# Patient Record
Sex: MALE | Race: BLACK OR AFRICAN AMERICAN | NOT HISPANIC OR LATINO | Employment: OTHER | URBAN - METROPOLITAN AREA
[De-identification: names, ages, dates, MRNs, and addresses within clinical notes are randomized per-mention and may not be internally consistent; named-entity substitution may affect disease eponyms.]

---

## 2020-04-20 ENCOUNTER — HOSPITAL ENCOUNTER (OUTPATIENT)
Facility: HOSPITAL | Age: 75
Discharge: HOME | End: 2020-04-20
Attending: EMERGENCY MEDICINE | Admitting: INTERNAL MEDICINE
Payer: MEDICARE

## 2020-04-20 VITALS
RESPIRATION RATE: 19 BRPM | WEIGHT: 109.25 LBS | TEMPERATURE: 98 F | HEART RATE: 77 BPM | OXYGEN SATURATION: 100 % | BODY MASS INDEX: 14.82 KG/M2 | DIASTOLIC BLOOD PRESSURE: 84 MMHG | SYSTOLIC BLOOD PRESSURE: 138 MMHG

## 2020-04-20 DIAGNOSIS — F10.20 ALCOHOL USE DISORDER, SEVERE, DEPENDENCE: ICD-10-CM

## 2020-04-20 DIAGNOSIS — R63.4 WEIGHT LOSS: ICD-10-CM

## 2020-04-20 DIAGNOSIS — J18.9 PNEUMONIA OF RIGHT UPPER LOBE DUE TO INFECTIOUS ORGANISM: Primary | ICD-10-CM

## 2020-04-20 DIAGNOSIS — E87.1 HYPONATREMIA: ICD-10-CM

## 2020-04-20 DIAGNOSIS — R21 RASH AND NONSPECIFIC SKIN ERUPTION: ICD-10-CM

## 2020-04-20 DIAGNOSIS — F17.200 TOBACCO USE DISORDER, MILD, ABUSE: ICD-10-CM

## 2020-04-20 DIAGNOSIS — D64.9 ANEMIA, UNSPECIFIED TYPE: ICD-10-CM

## 2020-04-20 DIAGNOSIS — R51.9 NONINTRACTABLE HEADACHE, UNSPECIFIED CHRONICITY PATTERN, UNSPECIFIED HEADACHE TYPE: ICD-10-CM

## 2020-04-20 DIAGNOSIS — R55 SYNCOPE AND COLLAPSE: ICD-10-CM

## 2020-04-20 DIAGNOSIS — Z91.148 NON COMPLIANCE W MEDICATION REGIMEN: ICD-10-CM

## 2020-04-20 LAB
ALBUMIN SERPL BCP-MCNC: 2.8 G/DL (ref 3.5–5.2)
ALLENS TEST: ABNORMAL
ALP SERPL-CCNC: 79 U/L (ref 55–135)
ALT SERPL W/O P-5'-P-CCNC: 14 U/L (ref 10–44)
ANION GAP SERPL CALC-SCNC: 10 MMOL/L (ref 8–16)
ANION GAP SERPL CALC-SCNC: 10 MMOL/L (ref 8–16)
AST SERPL-CCNC: 34 U/L (ref 10–40)
BASOPHILS # BLD AUTO: 0.01 K/UL (ref 0–0.2)
BASOPHILS NFR BLD: 0.2 % (ref 0–1.9)
BILIRUB SERPL-MCNC: 0.6 MG/DL (ref 0.1–1)
BILIRUB UR QL STRIP: NEGATIVE
BNP SERPL-MCNC: 69 PG/ML (ref 0–99)
BUN SERPL-MCNC: 6 MG/DL (ref 8–23)
BUN SERPL-MCNC: 7 MG/DL (ref 8–23)
CALCIUM SERPL-MCNC: 7.6 MG/DL (ref 8.7–10.5)
CALCIUM SERPL-MCNC: 8.3 MG/DL (ref 8.7–10.5)
CHLORIDE SERPL-SCNC: 92 MMOL/L (ref 95–110)
CHLORIDE SERPL-SCNC: 96 MMOL/L (ref 95–110)
CLARITY UR REFRACT.AUTO: CLEAR
CO2 SERPL-SCNC: 20 MMOL/L (ref 23–29)
CO2 SERPL-SCNC: 21 MMOL/L (ref 23–29)
COLOR UR AUTO: YELLOW
CREAT SERPL-MCNC: 0.7 MG/DL (ref 0.5–1.4)
CREAT SERPL-MCNC: 0.7 MG/DL (ref 0.5–1.4)
DELSYS: ABNORMAL
DIFFERENTIAL METHOD: ABNORMAL
EOSINOPHIL # BLD AUTO: 0 K/UL (ref 0–0.5)
EOSINOPHIL NFR BLD: 0.4 % (ref 0–8)
ERYTHROCYTE [DISTWIDTH] IN BLOOD BY AUTOMATED COUNT: 16.5 % (ref 11.5–14.5)
EST. GFR  (AFRICAN AMERICAN): >60 ML/MIN/1.73 M^2
EST. GFR  (AFRICAN AMERICAN): >60 ML/MIN/1.73 M^2
EST. GFR  (NON AFRICAN AMERICAN): >60 ML/MIN/1.73 M^2
EST. GFR  (NON AFRICAN AMERICAN): >60 ML/MIN/1.73 M^2
ETHANOL SERPL-MCNC: 206 MG/DL
FIO2: 21
GLUCOSE SERPL-MCNC: 107 MG/DL (ref 70–110)
GLUCOSE SERPL-MCNC: 87 MG/DL (ref 70–110)
GLUCOSE SERPL-MCNC: 88 MG/DL (ref 70–110)
GLUCOSE UR QL STRIP: NEGATIVE
HCO3 UR-SCNC: 20.8 MMOL/L (ref 24–28)
HCT VFR BLD AUTO: 30.8 % (ref 40–54)
HCT VFR BLD CALC: 36 %PCV (ref 36–54)
HGB BLD-MCNC: 10.8 G/DL (ref 14–18)
HGB UR QL STRIP: NEGATIVE
IMM GRANULOCYTES # BLD AUTO: 0.01 K/UL (ref 0–0.04)
IMM GRANULOCYTES NFR BLD AUTO: 0.2 % (ref 0–0.5)
KETONES UR QL STRIP: NEGATIVE
LACTATE SERPL-SCNC: 3.2 MMOL/L (ref 0.5–2.2)
LACTATE SERPL-SCNC: 3.3 MMOL/L (ref 0.5–2.2)
LEUKOCYTE ESTERASE UR QL STRIP: NEGATIVE
LIPASE SERPL-CCNC: 9 U/L (ref 4–60)
LYMPHOCYTES # BLD AUTO: 1.6 K/UL (ref 1–4.8)
LYMPHOCYTES NFR BLD: 28.3 % (ref 18–48)
MAGNESIUM SERPL-MCNC: 1.7 MG/DL (ref 1.6–2.6)
MCH RBC QN AUTO: 35.2 PG (ref 27–31)
MCHC RBC AUTO-ENTMCNC: 35.1 G/DL (ref 32–36)
MCV RBC AUTO: 100 FL (ref 82–98)
MODE: ABNORMAL
MONOCYTES # BLD AUTO: 0.8 K/UL (ref 0.3–1)
MONOCYTES NFR BLD: 14.1 % (ref 4–15)
NEUTROPHILS # BLD AUTO: 3.2 K/UL (ref 1.8–7.7)
NEUTROPHILS NFR BLD: 56.8 % (ref 38–73)
NITRITE UR QL STRIP: NEGATIVE
NRBC BLD-RTO: 0 /100 WBC
PCO2 BLDA: 34.2 MMHG (ref 35–45)
PH SMN: 7.39 [PH] (ref 7.35–7.45)
PH UR STRIP: 6 [PH] (ref 5–8)
PHOSPHATE SERPL-MCNC: 2.4 MG/DL (ref 2.7–4.5)
PLATELET # BLD AUTO: 101 K/UL (ref 150–350)
PMV BLD AUTO: 11.1 FL (ref 9.2–12.9)
PO2 BLDA: 35 MMHG (ref 40–60)
POC BE: -4 MMOL/L
POC IONIZED CALCIUM: 1.15 MMOL/L (ref 1.06–1.42)
POC SATURATED O2: 67 % (ref 95–100)
POCT GLUCOSE: 92 MG/DL (ref 70–110)
POTASSIUM BLD-SCNC: 3.9 MMOL/L (ref 3.5–5.1)
POTASSIUM SERPL-SCNC: 3.3 MMOL/L (ref 3.5–5.1)
POTASSIUM SERPL-SCNC: 4 MMOL/L (ref 3.5–5.1)
PROT SERPL-MCNC: 8.1 G/DL (ref 6–8.4)
PROT UR QL STRIP: NEGATIVE
RBC # BLD AUTO: 3.07 M/UL (ref 4.6–6.2)
SAMPLE: ABNORMAL
SARS-COV-2 RDRP RESP QL NAA+PROBE: NEGATIVE
SITE: ABNORMAL
SODIUM BLD-SCNC: 124 MMOL/L (ref 136–145)
SODIUM SERPL-SCNC: 123 MMOL/L (ref 136–145)
SODIUM SERPL-SCNC: 126 MMOL/L (ref 136–145)
SP GR UR STRIP: <=1.005 (ref 1–1.03)
TROPONIN I SERPL DL<=0.01 NG/ML-MCNC: 0.02 NG/ML (ref 0–0.03)
TSH SERPL DL<=0.005 MIU/L-ACNC: 3.08 UIU/ML (ref 0.4–4)
URN SPEC COLLECT METH UR: ABNORMAL
UROBILINOGEN UR STRIP-ACNC: NEGATIVE EU/DL
WBC # BLD AUTO: 5.66 K/UL (ref 3.9–12.7)

## 2020-04-20 PROCEDURE — G0378 HOSPITAL OBSERVATION PER HR: HCPCS | Mod: ER

## 2020-04-20 PROCEDURE — 80320 DRUG SCREEN QUANTALCOHOLS: CPT | Mod: ER

## 2020-04-20 PROCEDURE — 96372 THER/PROPH/DIAG INJ SC/IM: CPT | Mod: 59,ER

## 2020-04-20 PROCEDURE — 84100 ASSAY OF PHOSPHORUS: CPT | Mod: ER

## 2020-04-20 PROCEDURE — 83605 ASSAY OF LACTIC ACID: CPT | Mod: 91,ER

## 2020-04-20 PROCEDURE — 93010 EKG 12-LEAD: ICD-10-PCS | Mod: ,,, | Performed by: INTERNAL MEDICINE

## 2020-04-20 PROCEDURE — 85025 COMPLETE CBC W/AUTO DIFF WBC: CPT | Mod: ER

## 2020-04-20 PROCEDURE — 82962 GLUCOSE BLOOD TEST: CPT | Mod: ER

## 2020-04-20 PROCEDURE — 83735 ASSAY OF MAGNESIUM: CPT | Mod: ER

## 2020-04-20 PROCEDURE — 63600175 PHARM REV CODE 636 W HCPCS: Mod: ER | Performed by: EMERGENCY MEDICINE

## 2020-04-20 PROCEDURE — U0002 COVID-19 LAB TEST NON-CDC: HCPCS | Mod: ER

## 2020-04-20 PROCEDURE — 83880 ASSAY OF NATRIURETIC PEPTIDE: CPT | Mod: ER

## 2020-04-20 PROCEDURE — 83605 ASSAY OF LACTIC ACID: CPT | Mod: ER

## 2020-04-20 PROCEDURE — 99291 CRITICAL CARE FIRST HOUR: CPT | Mod: ER

## 2020-04-20 PROCEDURE — 96365 THER/PROPH/DIAG IV INF INIT: CPT | Mod: ER

## 2020-04-20 PROCEDURE — 80048 BASIC METABOLIC PNL TOTAL CA: CPT | Mod: ER

## 2020-04-20 PROCEDURE — 81003 URINALYSIS AUTO W/O SCOPE: CPT | Mod: ER

## 2020-04-20 PROCEDURE — 93005 ELECTROCARDIOGRAM TRACING: CPT | Mod: ER

## 2020-04-20 PROCEDURE — 83690 ASSAY OF LIPASE: CPT | Mod: ER

## 2020-04-20 PROCEDURE — 84443 ASSAY THYROID STIM HORMONE: CPT | Mod: ER

## 2020-04-20 PROCEDURE — 25000003 PHARM REV CODE 250: Mod: ER | Performed by: EMERGENCY MEDICINE

## 2020-04-20 PROCEDURE — 93010 ELECTROCARDIOGRAM REPORT: CPT | Mod: ,,, | Performed by: INTERNAL MEDICINE

## 2020-04-20 PROCEDURE — 96361 HYDRATE IV INFUSION ADD-ON: CPT | Mod: ER

## 2020-04-20 PROCEDURE — 96375 TX/PRO/DX INJ NEW DRUG ADDON: CPT | Mod: ER

## 2020-04-20 PROCEDURE — 99900035 HC TECH TIME PER 15 MIN (STAT): Mod: ER

## 2020-04-20 PROCEDURE — 80053 COMPREHEN METABOLIC PANEL: CPT | Mod: ER

## 2020-04-20 PROCEDURE — 84484 ASSAY OF TROPONIN QUANT: CPT | Mod: ER

## 2020-04-20 PROCEDURE — 87040 BLOOD CULTURE FOR BACTERIA: CPT | Mod: 59

## 2020-04-20 RX ORDER — ALBUTEROL SULFATE 90 UG/1
2 AEROSOL, METERED RESPIRATORY (INHALATION) EVERY 6 HOURS PRN
Qty: 18 G | Refills: 11 | Status: SHIPPED | OUTPATIENT
Start: 2020-04-20 | End: 2021-04-20

## 2020-04-20 RX ORDER — SODIUM CHLORIDE 9 MG/ML
1000 INJECTION, SOLUTION INTRAVENOUS
Status: COMPLETED | OUTPATIENT
Start: 2020-04-20 | End: 2020-04-20

## 2020-04-20 RX ORDER — AZITHROMYCIN 250 MG/1
TABLET, FILM COATED ORAL
Qty: 6 TABLET | Refills: 0 | Status: ON HOLD | OUTPATIENT
Start: 2020-04-20 | End: 2020-09-16

## 2020-04-20 RX ORDER — ACETAMINOPHEN 325 MG/1
650 TABLET ORAL
Status: COMPLETED | OUTPATIENT
Start: 2020-04-20 | End: 2020-04-20

## 2020-04-20 RX ORDER — THIAMINE HYDROCHLORIDE 100 MG/ML
100 INJECTION, SOLUTION INTRAMUSCULAR; INTRAVENOUS
Status: COMPLETED | OUTPATIENT
Start: 2020-04-20 | End: 2020-04-20

## 2020-04-20 RX ORDER — AMOXICILLIN AND CLAVULANATE POTASSIUM 875; 125 MG/1; MG/1
1 TABLET, FILM COATED ORAL 2 TIMES DAILY
Qty: 14 TABLET | Refills: 0 | Status: ON HOLD | OUTPATIENT
Start: 2020-04-20 | End: 2020-09-16

## 2020-04-20 RX ADMIN — CEFTRIAXONE 1 G: 1 INJECTION, SOLUTION INTRAVENOUS at 07:04

## 2020-04-20 RX ADMIN — SODIUM CHLORIDE 1000 ML: 0.9 INJECTION, SOLUTION INTRAVENOUS at 05:04

## 2020-04-20 RX ADMIN — ACETAMINOPHEN 650 MG: 325 TABLET ORAL at 06:04

## 2020-04-20 RX ADMIN — THIAMINE HYDROCHLORIDE 100 MG: 100 INJECTION, SOLUTION INTRAMUSCULAR; INTRAVENOUS at 05:04

## 2020-04-20 RX ADMIN — SODIUM CHLORIDE 500 ML: 0.9 INJECTION, SOLUTION INTRAVENOUS at 07:04

## 2020-04-20 RX ADMIN — AZITHROMYCIN MONOHYDRATE 500 MG: 500 INJECTION, POWDER, LYOPHILIZED, FOR SOLUTION INTRAVENOUS at 08:04

## 2020-04-20 NOTE — ED PROVIDER NOTES
"Encounter Date: 4/20/2020       History     Chief Complaint   Patient presents with    Dizziness    Fall    Cough    Altered Mental Status     unsure if this is the pt's baseline    Headache     75 y/o M with PMH of HTN, HLD, alcohol abuse, tobacco abuse here due to multiple syncopal episodes. Patient has had at least 2 over the past day per daughter, but the patient thinks he has had about 5. He reports trying to stand up, and then collapsing to the ground. Afterwards, he is able to get himself up without assistance, and does not feel confused. Patient says that over the past 5 days he has had associated frontal, pressure like headache, mild non productive cough, loose brown stools. Denies any fever, chest pain, SOB, back pain, neck pain, abdominal pain, black or tarry stools, dysuria. Has not taken any medications in "a while".        Review of patient's allergies indicates:  No Known Allergies  Past Medical History:   Diagnosis Date    Blind left eye      Past Surgical History:   Procedure Laterality Date    ABDOMINAL SURGERY       History reviewed. No pertinent family history.  Social History     Tobacco Use    Smoking status: Current Every Day Smoker     Packs/day: 2.00     Types: Cigarettes    Smokeless tobacco: Never Used   Substance Use Topics    Alcohol use: Yes     Alcohol/week: 8.0 standard drinks     Types: 8 Cans of beer per week     Comment: beer daily    Drug use: No     Review of Systems   Constitutional: Negative for chills and fever.   HENT: Negative for congestion and dental problem.    Eyes: Positive for visual disturbance. Negative for pain.        Blind in left eye at baseline.    Respiratory: Positive for cough. Negative for shortness of breath.    Cardiovascular: Negative for chest pain and palpitations.   Gastrointestinal: Positive for diarrhea. Negative for abdominal pain, nausea and vomiting.   Genitourinary: Negative for dysuria and flank pain.   Musculoskeletal: Negative for " back pain and neck pain.   Skin: Negative for rash and wound.   Neurological: Positive for syncope and headaches. Negative for weakness and numbness.       Physical Exam     Initial Vitals [04/20/20 1700]   BP Pulse Resp Temp SpO2   130/89 100 16 97.5 °F (36.4 °C) 100 %      MAP       --         Physical Exam    Nursing note and vitals reviewed.  Constitutional: No distress.   Frail, Cachectic.    HENT:   Head: Normocephalic and atraumatic.   Dry mucous membranes.    Eyes: EOM are normal.   Left eye with cloudy, white conjunctiva over the iris and pupil, at his baseline.    Neck: Normal range of motion. Neck supple.   Cardiovascular: Regular rhythm and intact distal pulses.   Tachycardia   Pulmonary/Chest: Breath sounds normal. No respiratory distress. He exhibits no tenderness.   Abdominal: Soft. He exhibits no distension and no mass. There is no tenderness. There is no rebound and no guarding.   Musculoskeletal: Normal range of motion. He exhibits no tenderness.   No midline spinal tenderness to palpation. No obvious bony defmormity. 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    Neurological: He has normal strength. No cranial nerve deficit or sensory deficit. GCS score is 15. GCS eye subscore is 4. GCS verbal subscore is 5. GCS motor subscore is 6.   Awake. Alert. Oriented to person, place, conversation, situation.    Skin: Skin is warm and dry.   Superficial hyperpigmented and scaling rash to upper back and arms.    Psychiatric: He has a normal mood and affect.         ED Course   Critical Care  Date/Time: 4/20/2020 7:24 PM  Performed by: Demetri Pedersen MD  Authorized by: Demetri Pedersen MD   Total critical care time (exclusive of procedural time) : 45 minutes  Critical care time was exclusive of separately billable procedures and treating other patients.  Critical care was necessary to treat or prevent imminent or life-threatening deterioration of the following conditions:  dehydration, respiratory failure, sepsis and metabolic crisis.        Labs Reviewed   CBC W/ AUTO DIFFERENTIAL - Abnormal; Notable for the following components:       Result Value    RBC 3.07 (*)     Hemoglobin 10.8 (*)     Hematocrit 30.8 (*)     Mean Corpuscular Volume 100 (*)     Mean Corpuscular Hemoglobin 35.2 (*)     RDW 16.5 (*)     Platelets 101 (*)     All other components within normal limits   COMPREHENSIVE METABOLIC PANEL - Abnormal; Notable for the following components:    Sodium 123 (*)     Chloride 92 (*)     CO2 21 (*)     BUN, Bld 7 (*)     Calcium 8.3 (*)     Albumin 2.8 (*)     All other components within normal limits   LACTIC ACID, PLASMA - Abnormal; Notable for the following components:    Lactate (Lactic Acid) 3.2 (*)     All other components within normal limits   PHOSPHORUS - Abnormal; Notable for the following components:    Phosphorus 2.4 (*)     All other components within normal limits   URINALYSIS, REFLEX TO URINE CULTURE - Abnormal; Notable for the following components:    Specific Gravity, UA <=1.005 (*)     All other components within normal limits    Narrative:     Preferred Collection Type->Urine, Clean Catch   ALCOHOL,MEDICAL (ETHANOL) - Abnormal; Notable for the following components:    Alcohol, Medical, Serum 206 (*)     All other components within normal limits   ISTAT PROCEDURE - Abnormal; Notable for the following components:    POC PCO2 34.2 (*)     POC PO2 35 (*)     POC HCO3 20.8 (*)     POC SATURATED O2 67 (*)     POC Sodium 124 (*)     All other components within normal limits   CULTURE, BLOOD   CULTURE, BLOOD   LIPASE   MAGNESIUM   SARS-COV-2 RNA AMPLIFICATION, QUAL   TROPONIN I   TSH   B-TYPE NATRIURETIC PEPTIDE   POCT GLUCOSE   POCT GLUCOSE MONITORING CONTINUOUS     Results for orders placed or performed during the hospital encounter of 04/20/20   CBC auto differential   Result Value Ref Range    WBC 5.66 3.90 - 12.70 K/uL    RBC 3.07 (L) 4.60 - 6.20 M/uL     Hemoglobin 10.8 (L) 14.0 - 18.0 g/dL    Hematocrit 30.8 (L) 40.0 - 54.0 %    Mean Corpuscular Volume 100 (H) 82 - 98 fL    Mean Corpuscular Hemoglobin 35.2 (H) 27.0 - 31.0 pg    Mean Corpuscular Hemoglobin Conc 35.1 32.0 - 36.0 g/dL    RDW 16.5 (H) 11.5 - 14.5 %    Platelets 101 (L) 150 - 350 K/uL    MPV 11.1 9.2 - 12.9 fL    Immature Granulocytes 0.2 0.0 - 0.5 %    Gran # (ANC) 3.2 1.8 - 7.7 K/uL    Immature Grans (Abs) 0.01 0.00 - 0.04 K/uL    Lymph # 1.6 1.0 - 4.8 K/uL    Mono # 0.8 0.3 - 1.0 K/uL    Eos # 0.0 0.0 - 0.5 K/uL    Baso # 0.01 0.00 - 0.20 K/uL    nRBC 0 0 /100 WBC    Gran% 56.8 38.0 - 73.0 %    Lymph% 28.3 18.0 - 48.0 %    Mono% 14.1 4.0 - 15.0 %    Eosinophil% 0.4 0.0 - 8.0 %    Basophil% 0.2 0.0 - 1.9 %    Differential Method Automated    Comprehensive metabolic panel   Result Value Ref Range    Sodium 123 (L) 136 - 145 mmol/L    Potassium 4.0 3.5 - 5.1 mmol/L    Chloride 92 (L) 95 - 110 mmol/L    CO2 21 (L) 23 - 29 mmol/L    Glucose 87 70 - 110 mg/dL    BUN, Bld 7 (L) 8 - 23 mg/dL    Creatinine 0.7 0.5 - 1.4 mg/dL    Calcium 8.3 (L) 8.7 - 10.5 mg/dL    Total Protein 8.1 6.0 - 8.4 g/dL    Albumin 2.8 (L) 3.5 - 5.2 g/dL    Total Bilirubin 0.6 0.1 - 1.0 mg/dL    Alkaline Phosphatase 79 55 - 135 U/L    AST 34 10 - 40 U/L    ALT 14 10 - 44 U/L    Anion Gap 10 8 - 16 mmol/L    eGFR if African American >60.0 >60 mL/min/1.73 m^2    eGFR if non African American >60.0 >60 mL/min/1.73 m^2   Lactic acid, plasma   Result Value Ref Range    Lactate (Lactic Acid) 3.2 (H) 0.5 - 2.2 mmol/L   Lipase   Result Value Ref Range    Lipase 9 4 - 60 U/L   Magnesium   Result Value Ref Range    Magnesium 1.7 1.6 - 2.6 mg/dL   Phosphorus   Result Value Ref Range    Phosphorus 2.4 (L) 2.7 - 4.5 mg/dL   Urinalysis, Reflex to Urine Culture Urine, Clean Catch   Result Value Ref Range    Specimen UA Urine, Clean Catch     Color, UA Yellow Yellow, Straw, Nora    Appearance, UA Clear Clear    pH, UA 6.0 5.0 - 8.0    Specific  Gravity, UA <=1.005 (A) 1.005 - 1.030    Protein, UA Negative Negative    Glucose, UA Negative Negative    Ketones, UA Negative Negative    Bilirubin (UA) Negative Negative    Occult Blood UA Negative Negative    Nitrite, UA Negative Negative    Urobilinogen, UA Negative <2.0 EU/dL    Leukocytes, UA Negative Negative   Ethanol   Result Value Ref Range    Alcohol, Medical, Serum 206 (H) <10 mg/dL   COVID-19 Routine Screening   Result Value Ref Range    SARS-CoV-2 RNA, Amplification, Qual Negative Negative   Troponin I   Result Value Ref Range    Troponin I 0.020 0.000 - 0.026 ng/mL   TSH   Result Value Ref Range    TSH 3.075 0.400 - 4.000 uIU/mL   Brain natriuretic peptide   Result Value Ref Range    BNP 69 0 - 99 pg/mL   POCT glucose   Result Value Ref Range    POCT Glucose 92 70 - 110 mg/dL   ISTAT PROCEDURE   Result Value Ref Range    POC PH 7.392 7.35 - 7.45    POC PCO2 34.2 (L) 35 - 45 mmHg    POC PO2 35 (LL) 40 - 60 mmHg    POC HCO3 20.8 (L) 24 - 28 mmol/L    POC BE -4 -2 to 2 mmol/L    POC SATURATED O2 67 (L) 95 - 100 %    POC Glucose 88 70 - 110 mg/dL    POC Sodium 124 (L) 136 - 145 mmol/L    POC Potassium 3.9 3.5 - 5.1 mmol/L    POC Ionized Calcium 1.15 1.06 - 1.42 mmol/L    POC Hematocrit 36 36 - 54 %PCV    Sample VENOUS     Site LB     Allens Test Pass     DelSys Room Air     Mode SPONT     FiO2 21             Imaging Results          CT Head Without Contrast (Final result)  Result time 04/20/20 19:12:02    Final result by Corky Nunez MD (04/20/20 19:12:02)                 Impression:      Negative for acute intracranial abnormality.    All CT scans at this facility are performed  using dose modulation techniques as appropriate to performed exam including the following:  automated exposure control; adjustment of mA and/or kV according to the patients size (this includes techniques or standardized protocols for targeted exams where dose is matched to indication/reason for exam: i.e. extremities or  head);  iterative reconstruction technique.      Electronically signed by: Corky Nunez MD  Date:    04/20/2020  Time:    19:12             Narrative:    EXAMINATION:  CT HEAD WITHOUT CONTRAST    CLINICAL HISTORY:  Confusion/delirium, altered LOC, unexplained;    TECHNIQUE:  Axial CT images obtained throughout the head without intravenous contrast.    COMPARISON:  None.    FINDINGS:  Negative for acute hemorrhage, mass effect, extraaxial collection, hydrocephalus.    There is good gray white matter differentiation.  Calcified plaque skull-base vasculature.    The paranasal sinuses and mastoids are clear.    The calvarium is unremarkable with no fractures.                               X-Ray Chest AP Portable (Final result)  Result time 04/20/20 18:59:56    Final result by Corky Nunez MD (04/20/20 18:59:56)                 Impression:      Low-grade central right perihilar upper lobe infiltrate/pneumonia.    Short-term follow-up after appropriate treatment advised      Electronically signed by: Corky Nunez MD  Date:    04/20/2020  Time:    18:59             Narrative:    EXAMINATION:  XR CHEST AP PORTABLE    CLINICAL HISTORY:  Syncope;    TECHNIQUE:  Single frontal view of the chest was performed.    COMPARISON:  None    FINDINGS:  The cardiomediastinal silhouette is normal.    There is a patchy mixed interstitial and ground-glass infiltrate in the central right perihilar lung within the upper lobe.  No effusion.    No acute osseous findings.                            8:03 PM Discussed case with Dr Mascorro, does not feel patient warrants admission for Pneumonia as he does not feel the Xray demonstrates an actual infiltrate. Discussed low sodium/ syncopal events and alcohol intoxication. Case thoroughly discussed and admission cancelled pending repeat lactic acid draw.     9:16 PM Repeat testing completed, discussed with Hospital medicine, they do not want to admit patient and feel the LA is secondary to  alcohol intoxication. Pt will be discharged with ABX to cover CAP and encouraged to stop drinking alcohol.    9:17 PM - Counseling: Spoke with the patient and discussed todays findings, in addition to providing specific details for the plan of care and counseling regarding the diagnosis and prognosis. Questions are answered at this time.                                       Clinical Impression:       ICD-10-CM ICD-9-CM   1. Pneumonia of right upper lobe due to infectious organism J18.1 486   2. Syncope and collapse R55 780.2   3. Nonintractable headache, unspecified chronicity pattern, unspecified headache type R51 784.0   4. Rash and nonspecific skin eruption R21 782.1   5. Alcohol use disorder, severe, dependence F10.20 303.90   6. Tobacco use disorder, mild, abuse F17.200 305.1   7. Non compliance w medication regimen Z91.14 V15.81   8. Hyponatremia E87.1 276.1   9. Weight loss R63.4 783.21   10. Anemia, unspecified type D64.9 285.9         Disposition:   Disposition: Discharged  Condition: Fair                        Demetri Pedersen MD  04/20/20 [id number]

## 2020-04-25 LAB
BACTERIA BLD CULT: NORMAL
BACTERIA BLD CULT: NORMAL

## 2020-09-15 ENCOUNTER — HOSPITAL ENCOUNTER (INPATIENT)
Facility: HOSPITAL | Age: 75
LOS: 4 days | Discharge: HOME HEALTH | DRG: 871 | End: 2020-09-19
Attending: EMERGENCY MEDICINE | Admitting: INTERNAL MEDICINE
Payer: MEDICARE

## 2020-09-15 DIAGNOSIS — R53.83 FATIGUE: ICD-10-CM

## 2020-09-15 DIAGNOSIS — N17.9 AKI (ACUTE KIDNEY INJURY): ICD-10-CM

## 2020-09-15 DIAGNOSIS — R79.89 ELEVATED LACTIC ACID LEVEL: ICD-10-CM

## 2020-09-15 DIAGNOSIS — J18.9 PNEUMONIA OF RIGHT MIDDLE LOBE DUE TO INFECTIOUS ORGANISM: ICD-10-CM

## 2020-09-15 DIAGNOSIS — Z45.2 ENCOUNTER FOR CENTRAL LINE PLACEMENT: ICD-10-CM

## 2020-09-15 DIAGNOSIS — D64.9 ANEMIA, UNSPECIFIED TYPE: ICD-10-CM

## 2020-09-15 DIAGNOSIS — R65.21 SEPTIC SHOCK: ICD-10-CM

## 2020-09-15 DIAGNOSIS — R06.02 SOB (SHORTNESS OF BREATH): Primary | ICD-10-CM

## 2020-09-15 DIAGNOSIS — A41.9 SEPTIC SHOCK: ICD-10-CM

## 2020-09-15 LAB
ALBUMIN SERPL BCP-MCNC: 2.6 G/DL (ref 3.5–5.2)
ALP SERPL-CCNC: 68 U/L (ref 55–135)
ALT SERPL W/O P-5'-P-CCNC: 7 U/L (ref 10–44)
AMPHET+METHAMPHET UR QL: NEGATIVE
ANION GAP SERPL CALC-SCNC: 13 MMOL/L (ref 8–16)
AST SERPL-CCNC: 16 U/L (ref 10–40)
BACTERIA #/AREA URNS AUTO: ABNORMAL /HPF
BARBITURATES UR QL SCN>200 NG/ML: NEGATIVE
BASOPHILS # BLD AUTO: 0.03 K/UL (ref 0–0.2)
BASOPHILS NFR BLD: 0.5 % (ref 0–1.9)
BENZODIAZ UR QL SCN>200 NG/ML: NEGATIVE
BILIRUB SERPL-MCNC: 0.1 MG/DL (ref 0.1–1)
BILIRUB UR QL STRIP: NEGATIVE
BNP SERPL-MCNC: 50 PG/ML (ref 0–99)
BUN SERPL-MCNC: 61 MG/DL (ref 8–23)
BZE UR QL SCN: NEGATIVE
CALCIUM SERPL-MCNC: 8.3 MG/DL (ref 8.7–10.5)
CANNABINOIDS UR QL SCN: NEGATIVE
CHLORIDE SERPL-SCNC: 103 MMOL/L (ref 95–110)
CLARITY UR REFRACT.AUTO: CLEAR
CO2 SERPL-SCNC: 15 MMOL/L (ref 23–29)
COLOR UR AUTO: YELLOW
CREAT SERPL-MCNC: 2.7 MG/DL (ref 0.5–1.4)
CREAT UR-MCNC: 43.7 MG/DL (ref 23–375)
DIFFERENTIAL METHOD: ABNORMAL
EOSINOPHIL # BLD AUTO: 0.1 K/UL (ref 0–0.5)
EOSINOPHIL NFR BLD: 1.4 % (ref 0–8)
ERYTHROCYTE [DISTWIDTH] IN BLOOD BY AUTOMATED COUNT: 15.1 % (ref 11.5–14.5)
EST. GFR  (AFRICAN AMERICAN): 25.9 ML/MIN/1.73 M^2
EST. GFR  (NON AFRICAN AMERICAN): 22.4 ML/MIN/1.73 M^2
ETHANOL SERPL-MCNC: 160 MG/DL
GLUCOSE SERPL-MCNC: 92 MG/DL (ref 70–110)
GLUCOSE UR QL STRIP: NEGATIVE
HCT VFR BLD AUTO: 28.4 % (ref 40–54)
HGB BLD-MCNC: 9.7 G/DL (ref 14–18)
HGB UR QL STRIP: NEGATIVE
IMM GRANULOCYTES # BLD AUTO: 0.03 K/UL (ref 0–0.04)
IMM GRANULOCYTES NFR BLD AUTO: 0.5 % (ref 0–0.5)
INFLUENZA A, MOLECULAR: NEGATIVE
INFLUENZA B, MOLECULAR: NEGATIVE
INR PPP: 0.9 (ref 0.8–1.2)
KETONES UR QL STRIP: NEGATIVE
LACTATE SERPL-SCNC: 1.4 MMOL/L (ref 0.5–2.2)
LACTATE SERPL-SCNC: 3.5 MMOL/L (ref 0.5–2.2)
LEUKOCYTE ESTERASE UR QL STRIP: ABNORMAL
LIPASE SERPL-CCNC: 32 U/L (ref 4–60)
LYMPHOCYTES # BLD AUTO: 1.8 K/UL (ref 1–4.8)
LYMPHOCYTES NFR BLD: 27.6 % (ref 18–48)
MAGNESIUM SERPL-MCNC: 2.6 MG/DL (ref 1.6–2.6)
MCH RBC QN AUTO: 34.2 PG (ref 27–31)
MCHC RBC AUTO-ENTMCNC: 34.2 G/DL (ref 32–36)
MCV RBC AUTO: 100 FL (ref 82–98)
METHADONE UR QL SCN>300 NG/ML: NEGATIVE
MICROSCOPIC COMMENT: ABNORMAL
MONOCYTES # BLD AUTO: 0.8 K/UL (ref 0.3–1)
MONOCYTES NFR BLD: 12.7 % (ref 4–15)
NEUTROPHILS # BLD AUTO: 3.7 K/UL (ref 1.8–7.7)
NEUTROPHILS NFR BLD: 57.3 % (ref 38–73)
NITRITE UR QL STRIP: NEGATIVE
NRBC BLD-RTO: 0 /100 WBC
OPIATES UR QL SCN: NEGATIVE
PCP UR QL SCN>25 NG/ML: NEGATIVE
PH UR STRIP: 6 [PH] (ref 5–8)
PHOSPHATE SERPL-MCNC: 3.6 MG/DL (ref 2.7–4.5)
PLATELET # BLD AUTO: 185 K/UL (ref 150–350)
PMV BLD AUTO: 11.4 FL (ref 9.2–12.9)
POTASSIUM SERPL-SCNC: 4.1 MMOL/L (ref 3.5–5.1)
PROCALCITONIN SERPL IA-MCNC: 0.11 NG/ML
PROT SERPL-MCNC: 7.5 G/DL (ref 6–8.4)
PROT UR QL STRIP: NEGATIVE
PROTHROMBIN TIME: 9.3 SEC (ref 9–12.5)
RBC # BLD AUTO: 2.84 M/UL (ref 4.6–6.2)
RBC #/AREA URNS AUTO: 2 /HPF (ref 0–4)
SARS-COV-2 RDRP RESP QL NAA+PROBE: NEGATIVE
SODIUM SERPL-SCNC: 131 MMOL/L (ref 136–145)
SP GR UR STRIP: <=1.005 (ref 1–1.03)
SPECIMEN SOURCE: NORMAL
SQUAMOUS #/AREA URNS AUTO: 5 /HPF
TOXICOLOGY INFORMATION: NORMAL
TROPONIN I SERPL DL<=0.01 NG/ML-MCNC: 0.06 NG/ML (ref 0–0.03)
URN SPEC COLLECT METH UR: ABNORMAL
UROBILINOGEN UR STRIP-ACNC: NEGATIVE EU/DL
WBC # BLD AUTO: 6.38 K/UL (ref 3.9–12.7)
WBC #/AREA URNS AUTO: 50 /HPF (ref 0–5)

## 2020-09-15 PROCEDURE — 81000 URINALYSIS NONAUTO W/SCOPE: CPT | Mod: 59,ER

## 2020-09-15 PROCEDURE — 83880 ASSAY OF NATRIURETIC PEPTIDE: CPT | Mod: ER

## 2020-09-15 PROCEDURE — 83605 ASSAY OF LACTIC ACID: CPT | Mod: 91,ER

## 2020-09-15 PROCEDURE — 86803 HEPATITIS C AB TEST: CPT

## 2020-09-15 PROCEDURE — 63600175 PHARM REV CODE 636 W HCPCS: Mod: ER | Performed by: EMERGENCY MEDICINE

## 2020-09-15 PROCEDURE — 96361 HYDRATE IV INFUSION ADD-ON: CPT | Mod: ER

## 2020-09-15 PROCEDURE — 85610 PROTHROMBIN TIME: CPT | Mod: ER

## 2020-09-15 PROCEDURE — 80307 DRUG TEST PRSMV CHEM ANLYZR: CPT | Mod: ER

## 2020-09-15 PROCEDURE — 83690 ASSAY OF LIPASE: CPT | Mod: ER

## 2020-09-15 PROCEDURE — 99285 EMERGENCY DEPT VISIT HI MDM: CPT | Mod: 25,ER

## 2020-09-15 PROCEDURE — 85025 COMPLETE CBC W/AUTO DIFF WBC: CPT | Mod: ER

## 2020-09-15 PROCEDURE — 87040 BLOOD CULTURE FOR BACTERIA: CPT | Mod: 59

## 2020-09-15 PROCEDURE — 84145 PROCALCITONIN (PCT): CPT | Mod: ER

## 2020-09-15 PROCEDURE — 25000003 PHARM REV CODE 250: Mod: ER | Performed by: EMERGENCY MEDICINE

## 2020-09-15 PROCEDURE — 99291 CRITICAL CARE FIRST HOUR: CPT | Mod: ER

## 2020-09-15 PROCEDURE — 87502 INFLUENZA DNA AMP PROBE: CPT | Mod: ER

## 2020-09-15 PROCEDURE — 96365 THER/PROPH/DIAG IV INF INIT: CPT | Mod: ER

## 2020-09-15 PROCEDURE — 80053 COMPREHEN METABOLIC PANEL: CPT | Mod: ER

## 2020-09-15 PROCEDURE — 93005 ELECTROCARDIOGRAM TRACING: CPT | Mod: ER

## 2020-09-15 PROCEDURE — 20000000 HC ICU ROOM

## 2020-09-15 PROCEDURE — 84100 ASSAY OF PHOSPHORUS: CPT | Mod: ER

## 2020-09-15 PROCEDURE — U0002 COVID-19 LAB TEST NON-CDC: HCPCS | Mod: ER

## 2020-09-15 PROCEDURE — 80320 DRUG SCREEN QUANTALCOHOLS: CPT | Mod: ER

## 2020-09-15 PROCEDURE — 96366 THER/PROPH/DIAG IV INF ADDON: CPT | Mod: ER

## 2020-09-15 PROCEDURE — 93010 ELECTROCARDIOGRAM REPORT: CPT | Mod: ,,, | Performed by: INTERNAL MEDICINE

## 2020-09-15 PROCEDURE — 36556 INSERT NON-TUNNEL CV CATH: CPT | Mod: ER

## 2020-09-15 PROCEDURE — 84484 ASSAY OF TROPONIN QUANT: CPT | Mod: ER

## 2020-09-15 PROCEDURE — 83735 ASSAY OF MAGNESIUM: CPT | Mod: ER

## 2020-09-15 PROCEDURE — 87086 URINE CULTURE/COLONY COUNT: CPT

## 2020-09-15 PROCEDURE — 96367 TX/PROPH/DG ADDL SEQ IV INF: CPT | Mod: ER

## 2020-09-15 PROCEDURE — 93010 EKG 12-LEAD: ICD-10-PCS | Mod: ,,, | Performed by: INTERNAL MEDICINE

## 2020-09-15 RX ORDER — NOREPINEPHRINE BITARTRATE/D5W 4MG/250ML
0.05 PLASTIC BAG, INJECTION (ML) INTRAVENOUS CONTINUOUS
Status: DISCONTINUED | OUTPATIENT
Start: 2020-09-15 | End: 2020-09-16

## 2020-09-15 RX ORDER — IBUPROFEN 200 MG
TABLET ORAL
Status: DISPENSED
Start: 2020-09-15 | End: 2020-09-16

## 2020-09-15 RX ORDER — LISINOPRIL 10 MG/1
TABLET ORAL
COMMUNITY
Start: 2020-09-01 | End: 2020-11-12 | Stop reason: ALTCHOICE

## 2020-09-15 RX ORDER — FOLIC ACID 1 MG/1
TABLET ORAL
Status: DISPENSED
Start: 2020-09-15 | End: 2020-09-16

## 2020-09-15 RX ORDER — ACETAMINOPHEN 325 MG/1
TABLET ORAL
Status: DISPENSED
Start: 2020-09-15 | End: 2020-09-16

## 2020-09-15 RX ORDER — THIAMINE HCL 100 MG
TABLET ORAL
Status: DISPENSED
Start: 2020-09-15 | End: 2020-09-16

## 2020-09-15 RX ORDER — CHLORDIAZEPOXIDE HYDROCHLORIDE 10 MG/1
CAPSULE, GELATIN COATED ORAL
Status: COMPLETED
Start: 2020-09-15 | End: 2020-09-19

## 2020-09-15 RX ORDER — SODIUM CHLORIDE 9 MG/ML
1000 INJECTION, SOLUTION INTRAVENOUS
Status: COMPLETED | OUTPATIENT
Start: 2020-09-15 | End: 2020-09-15

## 2020-09-15 RX ORDER — VANCOMYCIN HCL IN 5 % DEXTROSE 1G/250ML
1000 PLASTIC BAG, INJECTION (ML) INTRAVENOUS
Status: COMPLETED | OUTPATIENT
Start: 2020-09-15 | End: 2020-09-15

## 2020-09-15 RX ADMIN — Medication 0.05 MCG/KG/MIN: at 05:09

## 2020-09-15 RX ADMIN — PIPERACILLIN SODIUM AND TAZOBACTAM SODIUM 4.5 G: 4; .5 INJECTION, POWDER, LYOPHILIZED, FOR SOLUTION INTRAVENOUS at 04:09

## 2020-09-15 RX ADMIN — SODIUM CHLORIDE 1500 ML: 0.9 INJECTION, SOLUTION INTRAVENOUS at 03:09

## 2020-09-15 RX ADMIN — VANCOMYCIN HYDROCHLORIDE 1000 MG: 1 INJECTION, POWDER, LYOPHILIZED, FOR SOLUTION INTRAVENOUS at 05:09

## 2020-09-15 RX ADMIN — SODIUM CHLORIDE 1000 ML: 0.9 INJECTION, SOLUTION INTRAVENOUS at 04:09

## 2020-09-15 RX ADMIN — CHLORDIAZEPOXIDE HYDROCHLORIDE 10 MG: 10 CAPSULE ORAL at 11:09

## 2020-09-15 RX ADMIN — ACETAMINOPHEN 650 MG: 325 TABLET ORAL at 11:09

## 2020-09-15 NOTE — ED PROVIDER NOTES
Encounter Date: 9/15/2020       History     Chief Complaint   Patient presents with    Fatigue     SOB, cough and weakness, daughter says he can't walk due to weakness. normally ambulatory.      The history is provided by the patient and a relative.   Shortness of Breath  This is a new problem. The average episode lasts 4 days. The problem occurs rarely.The current episode started more than 2 days ago. The problem has been gradually worsening. Associated symptoms include sore throat, cough and sputum production. Pertinent negatives include no fever, no headaches, no coryza, no rhinorrhea, no swollen glands, no ear pain, no neck pain, no hemoptysis, no wheezing, no PND, no orthopnea, no chest pain, no syncope, no vomiting, no abdominal pain, no rash, no leg pain, no leg swelling and no claudication. Associated symptoms comments: Fatigue, decreased appetite . The problem's precipitants include smoke. Risk factors include smoking. He has tried nothing for the symptoms. The treatment provided no relief. Associated medical issues include COPD, pneumonia (earlier this year) and chronic lung disease. Associated medical issues do not include PE, CAD, heart failure, past MI, DVT or recent surgery.     Review of patient's allergies indicates:  No Known Allergies  Past Medical History:   Diagnosis Date    Blind left eye      Past Surgical History:   Procedure Laterality Date    ABDOMINAL SURGERY       History reviewed. No pertinent family history.  Social History     Tobacco Use    Smoking status: Current Every Day Smoker     Packs/day: 2.00     Types: Cigarettes    Smokeless tobacco: Never Used   Substance Use Topics    Alcohol use: Yes     Alcohol/week: 8.0 standard drinks     Types: 8 Cans of beer per week     Comment: beer daily    Drug use: No     Review of Systems   Constitutional: Negative for fever.   HENT: Positive for sore throat. Negative for ear pain and rhinorrhea.    Respiratory: Positive for cough, sputum  production and shortness of breath. Negative for hemoptysis and wheezing.    Cardiovascular: Negative for chest pain, orthopnea, claudication, leg swelling, syncope and PND.   Gastrointestinal: Negative for abdominal pain, nausea and vomiting.   Genitourinary: Negative for dysuria.   Musculoskeletal: Negative for back pain and neck pain.   Skin: Negative for rash.   Neurological: Negative for weakness and headaches.   Hematological: Does not bruise/bleed easily.   All other systems reviewed and are negative.      Physical Exam     Initial Vitals [09/15/20 1528]   BP Pulse Resp Temp SpO2   (!) 74/51 78 18 97.7 °F (36.5 °C) 99 %      MAP       --         Physical Exam    Nursing note and vitals reviewed.  Constitutional: He appears well-developed and well-nourished. He is not diaphoretic. No distress.   HENT:   Head: Normocephalic and atraumatic.   Right Ear: Hearing normal.   Left Ear: Hearing normal.   Nose: Nose normal.   Mouth/Throat: Uvula is midline. Mucous membranes are dry.   Eyes: EOM are normal. Pupils are equal, round, and reactive to light. No scleral icterus.   Cataract/chronic blindness of left eye   Neck: Normal range of motion. Neck supple. No thyromegaly present.   Cardiovascular: Normal rate, regular rhythm, normal heart sounds and intact distal pulses. Exam reveals no gallop and no friction rub.    No murmur heard.  Pulmonary/Chest: Breath sounds normal. No respiratory distress. He has no wheezes. He has no rhonchi. He exhibits no tenderness.   Abdominal: Soft. Bowel sounds are normal. He exhibits no distension. There is no abdominal tenderness. There is no rigidity, no rebound, no guarding, no CVA tenderness, no tenderness at McBurney's point and negative Green's sign. No hernia.   Musculoskeletal: Normal range of motion. No tenderness or edema.   Lymphadenopathy:     He has no cervical adenopathy.   Neurological: He is alert and oriented to person, place, and time. He has normal strength. No  cranial nerve deficit or sensory deficit.   Skin: Skin is warm and dry.   Psychiatric: He has a normal mood and affect. His behavior is normal. Judgment and thought content normal.         ED Course   Critical Care    Date/Time: 9/15/2020 6:03 PM  Performed by: Ervin Berg Jr., MD  Authorized by: Ervin Berg Jr., MD   Direct patient critical care time: 10 minutes  Additional history critical care time: 10 minutes  Ordering / reviewing critical care time: 10 minutes  Documentation critical care time: 10 minutes  Consulting other physicians critical care time: 10 minutes  Consult with family critical care time: 10 minutes  Other critical care time: 15 minutes  Total critical care time (exclusive of procedural time) : 75 minutes  Critical care time was exclusive of separately billable procedures and treating other patients and teaching time.  Critical care was necessary to treat or prevent imminent or life-threatening deterioration of the following conditions: circulatory failure, respiratory failure, dehydration and renal failure.  Critical care was time spent personally by me on the following activities: blood draw for specimens, development of treatment plan with patient or surrogate, discussions with consultants, interpretation of cardiac output measurements, evaluation of patient's response to treatment, examination of patient, obtaining history from patient or surrogate, ordering and performing treatments and interventions, ordering and review of laboratory studies, ordering and review of radiographic studies, pulse oximetry, re-evaluation of patient's condition and review of old charts.    Central Line    Date/Time: 9/15/2020 6:03 PM  Performed by: Ervin Berg Jr., MD  Authorized by: Ervin Berg Jr., MD     Location procedure was performed:  [facility] EMERGENCY DEPARTMENT  Consent Done ?:  Yes  Time out complete?: Verified correct patient, procedure, equipment, staff, and site/side    Indications:  Vascular  access, hemodynamic monitoring and med administration  Anesthesia:  Local infiltration  Local anesthetic:  Lidocaine 1% without epinephrine  Anesthetic total (ml):  2  Preparation:  Skin prepped with ChloraPrep  Skin prep agent dried: Skin prep agent completely dried prior to procedure    Sterile barriers: All five maximal sterile barriers used - gloves, gown, cap, mask and large sterile sheet    Hand hygiene: Hand hygiene performed immediately prior to central venous catheter insertion    Location:  Right internal jugular  Catheter type:  Triple lumen  Catheter size:  7 Fr  Ultrasound guidance: Yes    Vessel Caliber:  Large   patent  Comprressibility:  Normal  Needle advanced into vessel with real time ultrasound guidance.    Guidewire confirmed in vessel.    Steril sheath on probe.    Manometry: Yes    Number of attempts:  1  Securement:  Line sutured, chlorhexidine patch, sterile dressing applied and blood return through all ports  Complications: No    Specimens: No    Implants: No    XRay:  Placement verified by x-ray, no pneumothorax on x-ray, verified by fluoroscopy, tip termination and successful placement  Adverse Events:  None      Labs Reviewed   CBC W/ AUTO DIFFERENTIAL - Abnormal; Notable for the following components:       Result Value    RBC 2.84 (*)     Hemoglobin 9.7 (*)     Hematocrit 28.4 (*)     Mean Corpuscular Volume 100 (*)     Mean Corpuscular Hemoglobin 34.2 (*)     RDW 15.1 (*)     All other components within normal limits   COMPREHENSIVE METABOLIC PANEL - Abnormal; Notable for the following components:    Sodium 131 (*)     CO2 15 (*)     BUN, Bld 61 (*)     Creatinine 2.7 (*)     Calcium 8.3 (*)     Albumin 2.6 (*)     ALT 7 (*)     eGFR if  25.9 (*)     eGFR if non  22.4 (*)     All other components within normal limits   LACTIC ACID, PLASMA - Abnormal; Notable for the following components:    Lactate (Lactic Acid) 3.5 (*)     All other components within  normal limits    Narrative:      Lactic acid critical result(s) called and verbal readback obtained   from Stephen Spence RN 9/15/20 16:42 [facility]. by [facility] 09/15/2020 16:42   URINALYSIS, REFLEX TO URINE CULTURE - Abnormal; Notable for the following components:    Specific Gravity, UA <=1.005 (*)     Leukocytes, UA 2+ (*)     All other components within normal limits    Narrative:     Specimen Source->Urine   TROPONIN I - Abnormal; Notable for the following components:    Troponin I 0.062 (*)     All other components within normal limits   ALCOHOL,MEDICAL (ETHANOL) - Abnormal; Notable for the following components:    Alcohol, Medical, Serum 160 (*)     All other components within normal limits   URINALYSIS MICROSCOPIC - Abnormal; Notable for the following components:    WBC, UA 50 (*)     All other components within normal limits    Narrative:     Specimen Source->Urine   INFLUENZA A & B BY MOLECULAR   CULTURE, BLOOD   CULTURE, BLOOD   CULTURE, URINE   MAGNESIUM   PHOSPHORUS   PROTIME-INR   B-TYPE NATRIURETIC PEPTIDE   LIPASE   PROCALCITONIN   SARS-COV-2 RNA AMPLIFICATION, QUAL   DRUG SCREEN PANEL, URINE EMERGENCY    Narrative:     Specimen Source->Urine   HEPATITIS C ANTIBODY   LACTIC ACID, PLASMA     EKG Readings: (Independently Interpreted)   Initial Reading: No STEMI. Rhythm: Normal Sinus Rhythm. Heart Rate: 69. Ectopy: No Ectopy. Conduction: Normal. ST Segments: Normal ST Segments. T Waves: Normal. Axis: Left Axis Deviation. Clinical Impression: Normal Sinus Rhythm     ECG Results          EKG 12-lead (In process)  Result time 09/15/20 15:47:01    In process by Interface, Lab In [facility] (09/15/20 15:47:01)                 Narrative:    Test Reason : R53.83,    Vent. Rate : 069 BPM     Atrial Rate : 069 BPM     P-R Int : 180 ms          QRS Dur : 086 ms      QT Int : 416 ms       P-R-T Axes : 080 -53 077 degrees     QTc Int : 445 ms    Normal sinus rhythm  Left axis deviation  ST elevation, consider early  repolarization, pericarditis, or injury  Abnormal ECG  When compared with ECG of 20-APR-2020 17:28,  No significant change was found    Referred By: [email or web address]   SELF           Confirmed By:                             Imaging Results          X-Ray Chest AP Portable (In process)                X-Ray Chest AP Portable (Final result)  Result time 09/15/20 15:54:33    Final result by Ramana Calderon MD (09/15/20 15:54:33)                 Impression:      Right suprahilar infiltrate, slightly worse as compared to the prior chest x-ray.  Possible chronic infiltrate or recurrent infiltrate/aspiration.    COPD.      Electronically signed by: Ramana Calderon MD  Date:    09/15/2020  Time:    15:54             Narrative:    EXAMINATION:  XR CHEST AP PORTABLE    CLINICAL HISTORY:  Shortness of breath., Sepsis;    COMPARISON:  04/20/2020    FINDINGS:  Heart size is normal.    The left lung is grossly clear.    The right lung reveals infiltrative markings in the right suprahilar lung zone which are worse as compared to the previous exam.  Possible recurrent suprahilar infiltrate or aspiration pneumonia.                                    Vitals:    09/15/20 1528 09/15/20 1545 09/15/20 1553 09/15/20 1603   BP: (!) 74/51   (!) 102/58   Pulse: 78 64  63   Resp: 18      Temp: 97.7 °F (36.5 °C)      TempSrc: Oral      SpO2: 99%      Weight:   50 kg (110 lb 3.7 oz)     09/15/20 1613 09/15/20 1616 09/15/20 1631 09/15/20 1647   BP: (!) 88/51 (!) 85/53 94/61    Pulse: (!) 56  (!) 52 (!) 51   Resp:   (!) 34 (!) 22   Temp:       TempSrc:       SpO2:       Weight:        09/15/20 1703 09/15/20 1712   BP: (!) 96/57 (!) 81/53   Pulse: (!) 57 (!) 57   Resp: (!) 24 19   Temp:     TempSrc:     SpO2:     Weight:         Results for orders placed or performed during the hospital encounter of 09/15/20   Influenza A & B by Molecular    Specimen: Nasopharyngeal Swab   Result Value Ref Range    Influenza A, Molecular Negative Negative     Influenza B, Molecular Negative Negative    Flu A & B Source NP    CBC auto differential   Result Value Ref Range    WBC 6.38 3.90 - 12.70 K/uL    RBC 2.84 (L) 4.60 - 6.20 M/uL    Hemoglobin 9.7 (L) 14.0 - 18.0 g/dL    Hematocrit 28.4 (L) 40.0 - 54.0 %    Mean Corpuscular Volume 100 (H) 82 - 98 fL    Mean Corpuscular Hemoglobin 34.2 (H) 27.0 - 31.0 pg    Mean Corpuscular Hemoglobin Conc 34.2 32.0 - 36.0 g/dL    RDW 15.1 (H) 11.5 - 14.5 %    Platelets 185 150 - 350 K/uL    MPV 11.4 9.2 - 12.9 fL    Immature Granulocytes 0.5 0.0 - 0.5 %    Gran # (ANC) 3.7 1.8 - 7.7 K/uL    Immature Grans (Abs) 0.03 0.00 - 0.04 K/uL    Lymph # 1.8 1.0 - 4.8 K/uL    Mono # 0.8 0.3 - 1.0 K/uL    Eos # 0.1 0.0 - 0.5 K/uL    Baso # 0.03 0.00 - 0.20 K/uL    nRBC 0 0 /100 WBC    Gran% 57.3 38.0 - 73.0 %    Lymph% 27.6 18.0 - 48.0 %    Mono% 12.7 4.0 - 15.0 %    Eosinophil% 1.4 0.0 - 8.0 %    Basophil% 0.5 0.0 - 1.9 %    Differential Method Automated    Comprehensive metabolic panel   Result Value Ref Range    Sodium 131 (L) 136 - 145 mmol/L    Potassium 4.1 3.5 - 5.1 mmol/L    Chloride 103 95 - 110 mmol/L    CO2 15 (L) 23 - 29 mmol/L    Glucose 92 70 - 110 mg/dL    BUN, Bld 61 (H) 8 - 23 mg/dL    Creatinine 2.7 (H) 0.5 - 1.4 mg/dL    Calcium 8.3 (L) 8.7 - 10.5 mg/dL    Total Protein 7.5 6.0 - 8.4 g/dL    Albumin 2.6 (L) 3.5 - 5.2 g/dL    Total Bilirubin 0.1 0.1 - 1.0 mg/dL    Alkaline Phosphatase 68 55 - 135 U/L    AST 16 10 - 40 U/L    ALT 7 (L) 10 - 44 U/L    Anion Gap 13 8 - 16 mmol/L    eGFR if African American 25.9 (A) >60 mL/min/1.73 m^2    eGFR if non African American 22.4 (A) >60 mL/min/1.73 m^2   Lactic acid, plasma #1   Result Value Ref Range    Lactate (Lactic Acid) 3.5 (HH) 0.5 - 2.2 mmol/L   Urinalysis, Reflex to Urine Culture Urine, Clean Catch    Specimen: Urine   Result Value Ref Range    Specimen UA Urine, Clean Catch     Color, UA Yellow Yellow, Straw, Nora    Appearance, UA Clear Clear    pH, UA 6.0 5.0 - 8.0    Specific  Gravity, UA <=1.005 (A) 1.005 - 1.030    Protein, UA Negative Negative    Glucose, UA Negative Negative    Ketones, UA Negative Negative    Bilirubin (UA) Negative Negative    Occult Blood UA Negative Negative    Nitrite, UA Negative Negative    Urobilinogen, UA Negative <2.0 EU/dL    Leukocytes, UA 2+ (A) Negative   Magnesium   Result Value Ref Range    Magnesium 2.6 1.6 - 2.6 mg/dL   Phosphorus   Result Value Ref Range    Phosphorus 3.6 2.7 - 4.5 mg/dL   Protime-INR   Result Value Ref Range    Prothrombin Time 9.3 9.0 - 12.5 sec    INR 0.9 0.8 - 1.2   Brain natriuretic peptide   Result Value Ref Range    BNP 50 0 - 99 pg/mL   Lipase   Result Value Ref Range    Lipase 32 4 - 60 U/L   Troponin I   Result Value Ref Range    Troponin I 0.062 (H) 0.000 - 0.026 ng/mL   Procalcitonin   Result Value Ref Range    Procalcitonin 0.11 <0.25 ng/mL   COVID-19 Rapid Screening   Result Value Ref Range    SARS-CoV-2 RNA, Amplification, Qual Negative Negative   Ethanol   Result Value Ref Range    Alcohol, Medical, Serum 160 (H) <10 mg/dL   Drug screen panel, emergency   Result Value Ref Range    Benzodiazepines Negative     Methadone metabolites Negative     Cocaine (Metab.) Negative     Opiate Scrn, Ur Negative     Barbiturate Screen, Ur Negative     Amphetamine Screen, Ur Negative     THC Negative     Phencyclidine Negative     Creatinine, Random Ur 43.7 23.0 - 375.0 mg/dL    Toxicology Information SEE COMMENT    Urinalysis Microscopic   Result Value Ref Range    RBC, UA 2 0 - 4 /hpf    WBC, UA 50 (H) 0 - 5 /hpf    Bacteria Rare None-Occ /hpf    Squam Epithel, UA 5 /hpf    Microscopic Comment SEE COMMENT          Imaging Results          X-Ray Chest AP Portable (In process)                X-Ray Chest AP Portable (Final result)  Result time 09/15/20 15:54:33    Final result by Ramana Calderon MD (09/15/20 15:54:33)                 Impression:      Right suprahilar infiltrate, slightly worse as compared to the prior chest  x-ray.  Possible chronic infiltrate or recurrent infiltrate/aspiration.    COPD.      Electronically signed by: Ramana Calderon MD  Date:    09/15/2020  Time:    15:54             Narrative:    EXAMINATION:  XR CHEST AP PORTABLE    CLINICAL HISTORY:  Shortness of breath., Sepsis;    COMPARISON:  04/20/2020    FINDINGS:  Heart size is normal.    The left lung is grossly clear.    The right lung reveals infiltrative markings in the right suprahilar lung zone which are worse as compared to the previous exam.  Possible recurrent suprahilar infiltrate or aspiration pneumonia.                                Medications   vancomycin in dextrose 5 % 1 gram/250 mL IVPB 1,000 mg (1,000 mg Intravenous New Bag 9/15/20 1756)   vancomycin - pharmacy to dose (has no administration in time range)   norepinephrine 4 mg in dextrose 5% 250 mL infusion (premix) (titrating) (0.05 mcg/kg/min × 50 kg Intravenous New Bag 9/15/20 1756)   sodium chloride 0.9% bolus 1,500 mL (0 mL/kg × 50 kg Intravenous Stopped 9/15/20 1645)   0.9%  NaCl infusion (1,000 mLs Intravenous New Bag 9/15/20 1628)   piperacillin-tazobactam 4.5 g in dextrose 5 % 100 mL IVPB (ready to mix system) (4.5 g Intravenous New Bag 9/15/20 1658)       5:57 PM - CONSULT: Dr. Berg discussed the case with Dr. Galeano. Agrees with current management. Recommends admit icu and will see pt in hospital room.  Admitting Service: hospital medicine   Admitting Physician: Dr. Galeano   Admit to ICU    5:58 PM - Re-evaluation:  Discussed test results, shared treatment plan, and the need for admission with patient and family. They understand and agree to the plan as discussed. Answered questions at this time.     All historical, clinical, radiographic, and laboratory findings were reviewed with the patient/family in detail along with the indications for transport to the facility in [place] in order to receive further evaluation and treatment for pneumonia and nicolette.  All remaining  questions and concerns were addressed at this time and the patient/family communicates understanding and agrees to proceed accordingly.  Similarly, all pertinent details of the encounter were discussed with Dr. Galeano who agrees to receive the patient at [facility] for further care as outlined above.  The patient will be transferred by [facility] ambulance services secondary to a need for ongoing iv levophed, vanc, zosyn, ivf en route.  Ervin Berg MD  5:58 PM             Medication List      ASK your doctor about these medications    albuterol 90 mcg/actuation inhaler  Commonly known as: PROVENTIL/VENTOLIN HFA  Inhale 2 puffs into the lungs every 6 (six) hours as needed for Wheezing.     amoxicillin-clavulanate 875-125mg 875-125 mg per tablet  Commonly known as: AUGMENTIN  Take 1 tablet by mouth 2 (two) times daily.     azithromycin 250 MG tablet  Commonly known as: ZITHROMAX Z-GEORGES  2 tabs po d #1, then 1 po d #2-5     lisinopriL 10 MG tablet     naproxen 500 MG tablet  Commonly known as: NAPROSYN  Take 1 tablet (500 mg total) by mouth 2 (two) times daily with meals.           Current Discharge Medication List            ED Diagnosis  1. SOB (shortness of breath)    2. Fatigue    3. Pneumonia of right middle lobe due to infectious organism    4. Elevated lactic acid level    5. Anemia, unspecified type    6. LOPEZ (acute kidney injury)    7. Encounter for central line placement                                    Clinical Impression:       ICD-10-CM ICD-9-CM   1. SOB (shortness of breath)  R06.02 786.05   2. Fatigue  R53.83 780.79   3. Pneumonia of right middle lobe due to infectious organism  J18.1 486   4. Elevated lactic acid level  R79.89 276.2   5. Anemia, unspecified type  D64.9 285.9   6. LOPEZ (acute kidney injury)  N17.9 584.9   7. Encounter for central line placement  Z45.2 V58.81         Disposition:   Disposition: Admitted  Condition: Stable                          Ervin Berg Jr.,  MD  09/15/20 7007

## 2020-09-15 NOTE — ED NOTES
Shan Kingsley (daughter, and patients point of contact) - wdma - [phone]  Ty López (son) - rsvw - [phone]  Claudia (daughter) - djsg - [phone]  Birgit (daughter) - fxdu - [phone]

## 2020-09-16 PROBLEM — R13.12 OROPHARYNGEAL DYSPHAGIA: Status: ACTIVE | Noted: 2020-09-16

## 2020-09-16 PROBLEM — R79.89 ELEVATED TROPONIN: Status: ACTIVE | Noted: 2020-09-16

## 2020-09-16 PROBLEM — A41.9 SEVERE SEPSIS: Status: ACTIVE | Noted: 2020-09-16

## 2020-09-16 PROBLEM — N30.00 ACUTE CYSTITIS WITHOUT HEMATURIA: Status: ACTIVE | Noted: 2020-09-16

## 2020-09-16 PROBLEM — E43 SEVERE MALNUTRITION: Status: ACTIVE | Noted: 2020-09-16

## 2020-09-16 PROBLEM — R65.20 SEVERE SEPSIS: Status: ACTIVE | Noted: 2020-09-16

## 2020-09-16 PROBLEM — E87.1 HYPONATREMIA: Status: ACTIVE | Noted: 2020-09-16

## 2020-09-16 PROBLEM — R65.21 SEPTIC SHOCK: Status: ACTIVE | Noted: 2020-09-16

## 2020-09-16 PROBLEM — F10.10 ALCOHOL ABUSE: Status: ACTIVE | Noted: 2020-09-16

## 2020-09-16 LAB
ALBUMIN SERPL BCP-MCNC: 2.1 G/DL (ref 3.5–5.2)
ALP SERPL-CCNC: 55 U/L (ref 55–135)
ALT SERPL W/O P-5'-P-CCNC: <5 U/L (ref 10–44)
ANION GAP SERPL CALC-SCNC: 6 MMOL/L (ref 8–16)
ANISOCYTOSIS BLD QL SMEAR: SLIGHT
AST SERPL-CCNC: 10 U/L (ref 10–40)
BASOPHILS # BLD AUTO: 0.01 K/UL (ref 0–0.2)
BASOPHILS NFR BLD: 0.2 % (ref 0–1.9)
BILIRUB SERPL-MCNC: 0.2 MG/DL (ref 0.1–1)
BUN SERPL-MCNC: 38 MG/DL (ref 8–23)
CALCIUM SERPL-MCNC: 7.1 MG/DL (ref 8.7–10.5)
CHLORIDE SERPL-SCNC: 112 MMOL/L (ref 95–110)
CO2 SERPL-SCNC: 16 MMOL/L (ref 23–29)
CREAT SERPL-MCNC: 1.6 MG/DL (ref 0.5–1.4)
DIFFERENTIAL METHOD: ABNORMAL
EOSINOPHIL # BLD AUTO: 0.1 K/UL (ref 0–0.5)
EOSINOPHIL NFR BLD: 1.4 % (ref 0–8)
ERYTHROCYTE [DISTWIDTH] IN BLOOD BY AUTOMATED COUNT: 14.8 % (ref 11.5–14.5)
EST. GFR  (AFRICAN AMERICAN): 49 ML/MIN/1.73 M^2
EST. GFR  (NON AFRICAN AMERICAN): 42 ML/MIN/1.73 M^2
GLUCOSE SERPL-MCNC: 78 MG/DL (ref 70–110)
HCT VFR BLD AUTO: 27.9 % (ref 40–54)
HCV AB SERPL QL IA: POSITIVE
HGB BLD-MCNC: 9.7 G/DL (ref 14–18)
IMM GRANULOCYTES # BLD AUTO: 0.01 K/UL (ref 0–0.04)
IMM GRANULOCYTES NFR BLD AUTO: 0.2 % (ref 0–0.5)
LYMPHOCYTES # BLD AUTO: 1.2 K/UL (ref 1–4.8)
LYMPHOCYTES NFR BLD: 20.6 % (ref 18–48)
MCH RBC QN AUTO: 34.9 PG (ref 27–31)
MCHC RBC AUTO-ENTMCNC: 34.8 G/DL (ref 32–36)
MCV RBC AUTO: 100 FL (ref 82–98)
MONOCYTES # BLD AUTO: 0.7 K/UL (ref 0.3–1)
MONOCYTES NFR BLD: 12.2 % (ref 4–15)
NEUTROPHILS # BLD AUTO: 3.8 K/UL (ref 1.8–7.7)
NEUTROPHILS NFR BLD: 65.4 % (ref 38–73)
NRBC BLD-RTO: 0 /100 WBC
OVALOCYTES BLD QL SMEAR: ABNORMAL
PLATELET # BLD AUTO: 168 K/UL (ref 150–350)
PLATELET BLD QL SMEAR: ABNORMAL
PMV BLD AUTO: 11.4 FL (ref 9.2–12.9)
POIKILOCYTOSIS BLD QL SMEAR: SLIGHT
POTASSIUM SERPL-SCNC: 4.1 MMOL/L (ref 3.5–5.1)
PROT SERPL-MCNC: 6.2 G/DL (ref 6–8.4)
RBC # BLD AUTO: 2.78 M/UL (ref 4.6–6.2)
SODIUM SERPL-SCNC: 134 MMOL/L (ref 136–145)
TROPONIN I SERPL DL<=0.01 NG/ML-MCNC: 0.01 NG/ML (ref 0–0.03)
TROPONIN I SERPL DL<=0.01 NG/ML-MCNC: 0.02 NG/ML (ref 0–0.03)
VANCOMYCIN SERPL-MCNC: 6.2 UG/ML
WBC # BLD AUTO: 5.83 K/UL (ref 3.9–12.7)

## 2020-09-16 PROCEDURE — 92610 EVALUATE SWALLOWING FUNCTION: CPT

## 2020-09-16 PROCEDURE — 25000003 PHARM REV CODE 250: Performed by: PHYSICIAN ASSISTANT

## 2020-09-16 PROCEDURE — 97162 PT EVAL MOD COMPLEX 30 MIN: CPT

## 2020-09-16 PROCEDURE — 25000003 PHARM REV CODE 250: Performed by: NURSE PRACTITIONER

## 2020-09-16 PROCEDURE — 97530 THERAPEUTIC ACTIVITIES: CPT

## 2020-09-16 PROCEDURE — 80202 ASSAY OF VANCOMYCIN: CPT

## 2020-09-16 PROCEDURE — 97167 OT EVAL HIGH COMPLEX 60 MIN: CPT

## 2020-09-16 PROCEDURE — 80053 COMPREHEN METABOLIC PANEL: CPT

## 2020-09-16 PROCEDURE — S0030 INJECTION, METRONIDAZOLE: HCPCS | Performed by: FAMILY MEDICINE

## 2020-09-16 PROCEDURE — S4991 NICOTINE PATCH NONLEGEND: HCPCS | Performed by: PHYSICIAN ASSISTANT

## 2020-09-16 PROCEDURE — 20000000 HC ICU ROOM

## 2020-09-16 PROCEDURE — 99291 CRITICAL CARE FIRST HOUR: CPT | Mod: ,,, | Performed by: NURSE PRACTITIONER

## 2020-09-16 PROCEDURE — 99291 PR CRITICAL CARE, E/M 30-74 MINUTES: ICD-10-PCS | Mod: ,,, | Performed by: NURSE PRACTITIONER

## 2020-09-16 PROCEDURE — 97802 MEDICAL NUTRITION INDIV IN: CPT

## 2020-09-16 PROCEDURE — 63600175 PHARM REV CODE 636 W HCPCS: Performed by: FAMILY MEDICINE

## 2020-09-16 PROCEDURE — 84484 ASSAY OF TROPONIN QUANT: CPT

## 2020-09-16 PROCEDURE — 63600175 PHARM REV CODE 636 W HCPCS: Performed by: PHYSICIAN ASSISTANT

## 2020-09-16 PROCEDURE — 25000003 PHARM REV CODE 250: Performed by: FAMILY MEDICINE

## 2020-09-16 PROCEDURE — 85025 COMPLETE CBC W/AUTO DIFF WBC: CPT

## 2020-09-16 RX ORDER — ONDANSETRON 8 MG/1
8 TABLET, ORALLY DISINTEGRATING ORAL EVERY 8 HOURS PRN
Status: DISCONTINUED | OUTPATIENT
Start: 2020-09-16 | End: 2020-09-19 | Stop reason: HOSPADM

## 2020-09-16 RX ORDER — SODIUM CHLORIDE 9 MG/ML
INJECTION, SOLUTION INTRAVENOUS CONTINUOUS
Status: DISCONTINUED | OUTPATIENT
Start: 2020-09-16 | End: 2020-09-18

## 2020-09-16 RX ORDER — NOREPINEPHRINE BITARTRATE/D5W 4MG/250ML
0.02 PLASTIC BAG, INJECTION (ML) INTRAVENOUS CONTINUOUS
Status: DISCONTINUED | OUTPATIENT
Start: 2020-09-16 | End: 2020-09-17

## 2020-09-16 RX ORDER — FAMOTIDINE 20 MG/1
20 TABLET, FILM COATED ORAL DAILY
Status: DISCONTINUED | OUTPATIENT
Start: 2020-09-16 | End: 2020-09-17

## 2020-09-16 RX ORDER — SODIUM CHLORIDE 0.9 % (FLUSH) 0.9 %
10 SYRINGE (ML) INJECTION
Status: DISCONTINUED | OUTPATIENT
Start: 2020-09-16 | End: 2020-09-19 | Stop reason: HOSPADM

## 2020-09-16 RX ORDER — CHLORDIAZEPOXIDE HYDROCHLORIDE 10 MG/1
10 CAPSULE, GELATIN COATED ORAL EVERY 6 HOURS
Status: DISCONTINUED | OUTPATIENT
Start: 2020-09-16 | End: 2020-09-19 | Stop reason: HOSPADM

## 2020-09-16 RX ORDER — METRONIDAZOLE 500 MG/100ML
500 INJECTION, SOLUTION INTRAVENOUS
Status: DISCONTINUED | OUTPATIENT
Start: 2020-09-16 | End: 2020-09-18

## 2020-09-16 RX ORDER — THIAMINE HCL 100 MG
100 TABLET ORAL DAILY
Status: DISCONTINUED | OUTPATIENT
Start: 2020-09-16 | End: 2020-09-19 | Stop reason: HOSPADM

## 2020-09-16 RX ORDER — CEFEPIME HYDROCHLORIDE 1 G/50ML
2 INJECTION, SOLUTION INTRAVENOUS
Status: DISCONTINUED | OUTPATIENT
Start: 2020-09-16 | End: 2020-09-17

## 2020-09-16 RX ORDER — IBUPROFEN 200 MG
1 TABLET ORAL DAILY
Status: DISCONTINUED | OUTPATIENT
Start: 2020-09-16 | End: 2020-09-19 | Stop reason: HOSPADM

## 2020-09-16 RX ORDER — IPRATROPIUM BROMIDE AND ALBUTEROL SULFATE 2.5; .5 MG/3ML; MG/3ML
3 SOLUTION RESPIRATORY (INHALATION) EVERY 6 HOURS PRN
Status: DISCONTINUED | OUTPATIENT
Start: 2020-09-16 | End: 2020-09-19 | Stop reason: HOSPADM

## 2020-09-16 RX ORDER — MUPIROCIN 20 MG/G
OINTMENT TOPICAL 2 TIMES DAILY
Status: DISCONTINUED | OUTPATIENT
Start: 2020-09-16 | End: 2020-09-19 | Stop reason: HOSPADM

## 2020-09-16 RX ORDER — NOREPINEPHRINE BITARTRATE/D5W 4MG/250ML
0.05 PLASTIC BAG, INJECTION (ML) INTRAVENOUS CONTINUOUS
Status: DISCONTINUED | OUTPATIENT
Start: 2020-09-16 | End: 2020-09-16

## 2020-09-16 RX ORDER — ACETAMINOPHEN 325 MG/1
650 TABLET ORAL EVERY 6 HOURS PRN
Status: DISCONTINUED | OUTPATIENT
Start: 2020-09-16 | End: 2020-09-19 | Stop reason: HOSPADM

## 2020-09-16 RX ORDER — NAPROXEN SODIUM 220 MG/1
81 TABLET, FILM COATED ORAL DAILY
Status: DISCONTINUED | OUTPATIENT
Start: 2020-09-16 | End: 2020-09-19 | Stop reason: HOSPADM

## 2020-09-16 RX ORDER — HYDROCODONE BITARTRATE AND ACETAMINOPHEN 5; 325 MG/1; MG/1
1 TABLET ORAL EVERY 6 HOURS PRN
Status: DISCONTINUED | OUTPATIENT
Start: 2020-09-16 | End: 2020-09-19 | Stop reason: HOSPADM

## 2020-09-16 RX ORDER — HEPARIN SODIUM 5000 [USP'U]/ML
5000 INJECTION, SOLUTION INTRAVENOUS; SUBCUTANEOUS EVERY 8 HOURS
Status: DISCONTINUED | OUTPATIENT
Start: 2020-09-16 | End: 2020-09-19 | Stop reason: HOSPADM

## 2020-09-16 RX ORDER — FOLIC ACID 1 MG/1
1 TABLET ORAL DAILY
Status: DISCONTINUED | OUTPATIENT
Start: 2020-09-16 | End: 2020-09-19 | Stop reason: HOSPADM

## 2020-09-16 RX ADMIN — HEPARIN SODIUM 5000 UNITS: 5000 INJECTION INTRAVENOUS; SUBCUTANEOUS at 01:09

## 2020-09-16 RX ADMIN — Medication 0.02 MCG/KG/MIN: at 07:09

## 2020-09-16 RX ADMIN — METRONIDAZOLE 500 MG: 500 INJECTION, SOLUTION INTRAVENOUS at 10:09

## 2020-09-16 RX ADMIN — CHLORDIAZEPOXIDE HYDROCHLORIDE 10 MG: 10 CAPSULE ORAL at 05:09

## 2020-09-16 RX ADMIN — CEFEPIME HYDROCHLORIDE 2 G: 2 INJECTION, SOLUTION INTRAVENOUS at 08:09

## 2020-09-16 RX ADMIN — Medication 100 MG: at 12:09

## 2020-09-16 RX ADMIN — ASPIRIN 81 MG 81 MG: 81 TABLET ORAL at 08:09

## 2020-09-16 RX ADMIN — VANCOMYCIN HYDROCHLORIDE 750 MG: 750 INJECTION, POWDER, LYOPHILIZED, FOR SOLUTION INTRAVENOUS at 08:09

## 2020-09-16 RX ADMIN — Medication 1 PATCH: at 12:09

## 2020-09-16 RX ADMIN — SODIUM CHLORIDE: 0.9 INJECTION, SOLUTION INTRAVENOUS at 12:09

## 2020-09-16 RX ADMIN — HEPARIN SODIUM 5000 UNITS: 5000 INJECTION INTRAVENOUS; SUBCUTANEOUS at 09:09

## 2020-09-16 RX ADMIN — Medication 100 MG: at 08:09

## 2020-09-16 RX ADMIN — HEPARIN SODIUM 5000 UNITS: 5000 INJECTION INTRAVENOUS; SUBCUTANEOUS at 05:09

## 2020-09-16 RX ADMIN — SODIUM CHLORIDE: 0.9 INJECTION, SOLUTION INTRAVENOUS at 10:09

## 2020-09-16 RX ADMIN — THERA TABS 1 TABLET: TAB at 08:09

## 2020-09-16 RX ADMIN — FOLIC ACID 1 MG: 1 TABLET ORAL at 08:09

## 2020-09-16 RX ADMIN — FAMOTIDINE 20 MG: 20 TABLET ORAL at 08:09

## 2020-09-16 RX ADMIN — LORAZEPAM 1 MG: 2 INJECTION INTRAMUSCULAR; INTRAVENOUS at 08:09

## 2020-09-16 RX ADMIN — FOLIC ACID 1 MG: 1 TABLET ORAL at 12:09

## 2020-09-16 RX ADMIN — METRONIDAZOLE 500 MG: 500 INJECTION, SOLUTION INTRAVENOUS at 05:09

## 2020-09-16 RX ADMIN — CHLORDIAZEPOXIDE HYDROCHLORIDE 10 MG: 10 CAPSULE ORAL at 11:09

## 2020-09-16 RX ADMIN — Medication 1 PATCH: at 08:09

## 2020-09-16 RX ADMIN — PIPERACILLIN AND TAZOBACTAM 4.5 G: 4; .5 INJECTION, POWDER, LYOPHILIZED, FOR SOLUTION INTRAVENOUS; PARENTERAL at 01:09

## 2020-09-16 RX ADMIN — MUPIROCIN: 20 OINTMENT TOPICAL at 08:09

## 2020-09-16 NOTE — PROGRESS NOTES
eICU Brief Note    Issue: Sepsis, pneumonia  Brief need for levophed per RN  Initial fatigue; LA 3.5 down to 1.4  LOPEZ   EtOH use    Resting on video  BP (!) 102/58 (BP Location: Left arm, Patient Position: Lying)   Pulse (!) 58   Temp 97 °F (36.1 °C) (Temporal)   Resp 13   Ht 6' (1.829 m)   Wt 50 kg (110 lb 3.7 oz)   SpO2 100%   BMI 14.95 kg/m²     Plan :    Monitor BP  Trop I mild rise follow  Watch for alcohol withdrawal  CXR with RUL opacity-? Chronic aspiration  Given smoking hx get CT chest plain  LOPEZ- monitor-? dry    D/w RN      0623 CT sev emphysema and RUL pneumonia- suggest sputum check and AFB stain and consider pulmonary consult; informed bedside; check routine culture; check for aspiration with speech eval

## 2020-09-16 NOTE — ED NOTES
Attempted to call report to [facility] 15. Nurse unavailable at this time. Will return call in 15 minl

## 2020-09-16 NOTE — ASSESSMENT & PLAN NOTE
-Patient counseled on cessation.   -Ativan for DT symptoms and Librium for DT prophylaxis.   -MVI, thiamine and folic acid.

## 2020-09-16 NOTE — CONSULTS
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 presented to ED on 9/15 with daughter reporting low BP, SOB, weakness, dizziness  Pt reports heavy ETOH use (6pack beer and pint of gin daily) with recent decrease from previous 24 beer + gin daily  ED evaluation revealed ETOH 160; lactate 3.5; creatinine 2.7 (baseline 0.7 on 4/2020); troponin 0.062; h/h 9.7/28.4; albumin 2.6; procalcitonin 0.11; + hepatitis C ab  Initial BP 66/42 improved with 1.5L IVF but fell again requiring CL placement and initiation of pressor support    Hospital/ICU Course:  Admitted to ICU overnight for sepsis requiring pressor, suspected source RUL pneumonia  9/16 - required low dose pressor overnight and ultimately weaned off around 1500 this afternoon; evaluated by ST for concern for aspiration who recommend [facility] soft dysphagia diet with thin liquids and additional work up with MBSS    Past Medical History:   Diagnosis Date    Blind left eye        Past Surgical History:   Procedure Laterality Date    ABDOMINAL SURGERY         Review of patient's allergies indicates:  No Known Allergies    Family History     None        Tobacco Use    Smoking status: Current Every Day Smoker     Packs/day: 2.00     Types: Cigarettes    Smokeless tobacco: Never Used   Substance and Sexual Activity    Alcohol use: Yes     Alcohol/week: 8.0 standard drinks     Types: 8 Cans of beer per week     Comment: beer daily    Drug use: No    Sexual activity: Not on file         Review of Systems   Constitutional: Positive for activity change and fatigue. Negative for chills and fever.   HENT: Positive for congestion. Negative for sore  throat.    Eyes:        Left eye blindness   Respiratory: Positive for cough and shortness of breath.    Cardiovascular: Negative for chest pain, palpitations and leg swelling.   Gastrointestinal: Negative for abdominal pain, diarrhea, nausea and vomiting.   Genitourinary: Negative for difficulty urinating.   Musculoskeletal: Negative for back pain and neck stiffness.   Skin: Negative for wound.   Neurological: Negative for tremors, speech difficulty and headaches.   Psychiatric/Behavioral: The patient is not nervous/anxious.      Objective:     Vital Signs (Most Recent):  Temp: 96.7 °F (35.9 °C) (09/16/20 1505)  Pulse: 66 (09/16/20 1530)  Resp: (!) 22 (09/16/20 1530)  BP: 113/66 (09/16/20 1515)  SpO2: 100 % (09/16/20 1530) Vital Signs (24h Range):  Temp:  [96.5 °F (35.8 °C)-97.5 °F (36.4 °C)] 96.7 °F (35.9 °C)  Pulse:  [49-76] 66  Resp:  [13-35] 22  SpO2:  [97 %-100 %] 100 %  BP: ()/(44-89) 113/66     Weight: 50 kg (110 lb 3.7 oz)  Body mass index is 14.95 kg/m².      Intake/Output Summary (Last 24 hours) at 9/16/2020 1600  Last data filed at 9/16/2020 1505  Gross per 24 hour   Intake 4592.11 ml   Output 250 ml   Net 4342.11 ml      sodium chloride 0.9% 100 mL/hr at 09/16/20 1505         Physical Exam  Vitals signs and nursing note reviewed.   Constitutional:       Appearance: He is cachectic. He is ill-appearing.      Comments: Temporal wasting   HENT:      Mouth/Throat:      Mouth: Mucous membranes are dry.   Eyes:      Comments: Left eye opacity   Cardiovascular:      Rate and Rhythm: Normal rate and regular rhythm.      Pulses:           Radial pulses are 2+ on the right side and 2+ on the left side.        Dorsalis pedis pulses are 2+ on the right side and 2+ on the left side.   Pulmonary:      Effort: Pulmonary effort is normal.      Breath sounds: Normal breath sounds.   Abdominal:      General: Abdomen is flat. Bowel sounds are normal. There is no distension.      Palpations: Abdomen is soft.       Tenderness: There is no abdominal tenderness.   Musculoskeletal:      Right lower leg: No edema.      Left lower leg: No edema.   Skin:     General: Skin is warm and dry.      Capillary Refill: Capillary refill takes 2 to 3 seconds.   Neurological:      Mental Status: He is alert and oriented to person, place, and time.   Psychiatric:         Behavior: Behavior is cooperative.         Vents:       Lines/Drains/Airways     Central Venous Catheter Line            Percutaneous Central Line Insertion/Assessment - Triple Lumen  09/15/20 1756 right internal jugular less than 1 day          Peripheral Intravenous Line                 Peripheral IV - Single Lumen 09/15/20 1555 20 G Left Antecubital 1 day         Peripheral IV - Single Lumen 09/15/20 1555 20 G Right Antecubital 1 day                Significant Labs:    CBC/Anemia Profile:  Recent Labs   Lab 09/15/20  1545 09/16/20  0715   WBC 6.38 5.83   HGB 9.7* 9.7*   HCT 28.4* 27.9*    168   * 100*   RDW 15.1* 14.8*        Chemistries:  Recent Labs   Lab 09/15/20  1545 09/16/20  0715   * 134*   K 4.1 4.1    112*   CO2 15* 16*   BUN 61* 38*   CREATININE 2.7* 1.6*   CALCIUM 8.3* 7.1*   ALBUMIN 2.6* 2.1*   PROT 7.5 6.2   BILITOT 0.1 0.2   ALKPHOS 68 55   ALT 7* <5*   AST 16 10   MG 2.6  --    PHOS 3.6  --        All pertinent labs within the past 24 hours have been reviewed.    Significant Imaging:   I have reviewed all pertinent imaging results/findings within the past 24 hours.      ABG  No results for input(s): PH, PO2, PCO2, HCO3, BE in the last 168 hours.  Assessment/Plan:     Psychiatric  Alcohol abuse  Expresses recent decrease and active desire to quit  Librium for DT prophylaxis  Monitor CIWA AR scale  Offer outpatient cessation support resources prior to discharge    Pulmonary  Pneumonia of right middle lobe due to infectious organism  Suspicious for aspiration  Empiric abx  Encourage TCDB and mobilization  Supplemental oxygen prn to keep  SAT > 92    Renal/  Acute cystitis without hematuria  Empiric abx  Urine culture pending    Hyponatremia  Likely multifactorial with malnutrition and ETOH abuse  Continue IV hydration  Encourage ETOH cessation and optimization of nutrition    LOPEZ (acute kidney injury)  Prerenal s/t septic shock, volume depletion  Continue IVF resuscitation  Keep MAP > 65  Strict I/O  Monitor creatinine  Avoid nephrotoxins    ID  * Septic shock  Source - RUL pneumonia, UTI  Empiric antimicrobial coverage - zosyn, vancomycin  Blood and urine cultures pending; culture sputum if able to obtain  Pressor prn to keep MAP > 65  Continue IV hydration    Endocrine  Severe malnutrition  Encourage po intake and ETOH cessation    GI  Oropharyngeal dysphagia  Diet per [facility]  MBSS ordered per recs        Critical Care Daily Checklist:    A: Awake: RASS Goal/Actual Goal:    Actual: [place] Agitation Sedation Scale (RASS): Alert and calm   B: Spontaneous Breathing Trial Performed?     C: SAT & SBT Coordinated?  n/a                      D: Delirium: CAM-ICU Overall CAM-ICU: Negative   E: Early Mobility Performed? Yes   F: Feeding Goal: Goals: % meal intake + Boost Plus by RD f/u  Status: Nutrition Goal Status: new   Current Diet Order   Procedures    Diet Dysphagia Mechanical Soft (IDDSI Level 5) [facility]; Isolation Tray - Regular [place]; Thin     Mechanical soft, Thin   Aspiration Precautions: 1 bite/sip at a time, Double swallow with each bite/sip, HOB to 90 degrees and Remain upright 30 minutes post meal     Order Specific Question:   Indicate patient location for additional diet options:     Answer:   [facility]     Order Specific Question:   Tray type:     Answer[facility]   [facility] - [facility] [place]     Order Specific Question:   Fluid consistency:     Answer:   Thin      AS: Analgesia/Sedation prn   T: Thromboembolic Prophylaxis heparin   H: HOB > 300 Yes   U: Stress Ulcer Prophylaxis (if needed) pepcid   G: Glucose  Control monitor   B: Bowel Function Stool Occurrence: 1   I: Indwelling Catheter (Lines & Romo) Necessity reviewed   D: De-escalation of Antimicrobials/Pharmacotherapies reviewed    Plan for the day/ETD As above    Code Status:  Family/Goals of Care: Full Code  Home on discharge, likely transition to living with son per pt report   I have discussed case and plan of care in detail with Dr Stephen and Dr Larsen; Status and plan of care were discussed with team on multidisciplinary rounds.    Critical Care Time: 50 minutes  Critical secondary to septic shock; Critical care was time spent personally by me on the following activities: development of treatment plan with patient or surrogate and bedside caregivers, discussions with consultants, evaluation of patient's response to treatment, examination of patient, ordering and performing treatments and interventions, ordering and review of laboratory studies, ordering and review of radiographic studies, pulse oximetry, re-evaluation of patient's condition. This critical care time did not overlap with that of any other provider or involve time for any procedures.    Thank you for your consult.      LIANA Melgoza-BC  Critical Care Medicine  [facility] [place]

## 2020-09-16 NOTE — PLAN OF CARE
Recommendations    Recommendation:   1. Continue regular diet, and add Boost Plus TID   2. RD to f/u    Goals: % meal intake + Boost Plus by RD f/u  Nutrition Goal Status: new  Communication of RD Recs: POC, sticky note

## 2020-09-16 NOTE — PLAN OF CARE
Met with pt to discuss recommendation by therapy  to DC home with home health. Pt asked that CM call daughter Shan Kingsley for preference. Contacted Shan who gave preference for [facility]. Updated Marquita, liaison with [facility] and referral sent via [facility]. Pt will need bedside commode, bath bench, RW at time of DC.    09/16/20 5988   Post-Acute Status   Post-Acute Authorization Home Health   Home Health Status Referrals Sent   Part D Coverage na   Discharge Delays (!) Other   Discharge Plan   Discharge Plan A Home;Home with family;Home Health

## 2020-09-16 NOTE — PT/OT/SLP EVAL
Physical Therapy Evaluation    Patient Name:  Ty López   MRN:  [id number]    Recommendations:     Discharge Recommendations:  home health PT(WITH FAMILY ASSISTANCE FOR SAFETY)   Discharge Equipment Recommendations: bedside commode, bath bench, walker, rolling   Barriers to discharge: None    Assessment:     Ty López is a 75 y.o. male admitted with a medical diagnosis of Septic shock.  He presents with the following impairments/functional limitations:  weakness, impaired balance, gait instability, impaired endurance, impaired functional mobilty, decreased coordination, visual deficits.    Rehab Prognosis: Good; patient would benefit from acute skilled PT services to address these deficits and reach maximum level of function.    Recent Surgery: * No surgery found *    Plan:     During this hospitalization, patient to be seen 5 x/week to address the identified rehab impairments via gait training, therapeutic activities, therapeutic exercises and progress toward the following goals:    · Plan of Care Expires:  09/23/20    Subjective     Chief Complaint:   Patient/Family Comments/goals:   Pain/Comfort:  · Pain Rating 1: 0/10    Patients cultural, spiritual, Lutheran conflicts given the current situation:      Living Environment:  PT LIVES ALONE BUT REPORTS HE WILL BE STAYING WITH HIS SON AFTER D/C, MOBILE HOME 5 STEPS TO ENTER B RAIL, PTA PT WAS AMB [facility] BUT C/O DIZZINESS AND WEAKNESS, DOES NOT DRIVE, INDEP WITH ADL'S BUT REPORTS VERY DIFFICULT  Prior to admission, patients level of function was .  Equipment used at home: none.  DME owned (not currently used): none.  Upon discharge, patient will have assistance from SON.    Objective:     Communicated with NURSE COPPOLA prior to session.  Patient found supine with telemetry, peripheral IV, blood pressure cuff, pulse ox (continuous)  upon PT entry to room.    General Precautions: Standard, fall, respiratory   Orthopedic Precautions:N/A   Braces: N/A      Exams:  · Cognitive Exam:  Patient is oriented to Person, Place, Time and Situation  · Postural Exam:  Patient presented with the following abnormalities:    · -       Rounded shoulders  · -       Forward head  · Sensation:    · -       Intact  · RLE ROM: WFL  · RLE Strength: GROSSLY 3+/5  · LLE ROM: WFL  · LLE Strength: GROSSLY 3+/5    Functional Mobility:  · Bed Mobility:     · Rolling Left:  stand by assistance  · Scooting: stand by assistance  · Supine to Sit: stand by assistance  · Transfers:     · Sit to Stand:  contact guard assistance with rolling walker  · Bed to Chair: contact guard assistance with  rolling walker  using  Step Transfer  · Gait: PT AMB 5' WITH HHA/HERNANDEZ TO TF TO CHAIR, CUES FOR UPRIGHT POSTURE, VERBAL AND TACTILE CUES AS NEEDED AS WELL AS VISUAL CUES AS NEEDED  · Balance: POOR+    Therapeutic Activities and Exercises:   PT EDUCATED IN ROLE OF P.T. AND POC, PT EDUCATED IN BLE THEREX TO PERFORM WHILE SEATED IN CHAIR, PT ENCOURAGED TO INCREASE TIME OOB IN CHAIR, PT SET UP FOR BREAKFAST    AM-PAC 6 CLICK MOBILITY  Total Score:18     Patient left up in chair with all lines intact, call button in reach, NURSE notified and AIDE present.    GOALS:   Multidisciplinary Problems     Physical Therapy Goals        Problem: Physical Therapy Goal    Goal Priority Disciplines Outcome Goal Variances Interventions   Physical Therapy Goal     PT, PT/OT      Description: LTG'S TO BE MET IN 7 DAYS (9-23-20)  1. PT WILL REQUIRE SPV FOR BED MOBILITY  2. PT WILL REQUIRE SBA FOR BED MOBILITY  3. PT WILL ' WITH RW AND SBA                   History:     Past Medical History:   Diagnosis Date    Blind left eye        Past Surgical History:   Procedure Laterality Date    ABDOMINAL SURGERY         Time Tracking:     PT Received On: 09/16/20  PT Start Time: 0830     PT Stop Time: 0855  PT Total Time (min): 25 min     Billable Minutes: Evaluation 15 and Therapeutic Activity 10    Stacy Ledezma  PT  09/16/2020

## 2020-09-16 NOTE — SUBJECTIVE & OBJECTIVE
Past Medical History:   Diagnosis Date    Blind left eye        Past Surgical History:   Procedure Laterality Date    ABDOMINAL SURGERY         Review of patient's allergies indicates:  No Known Allergies    Family History     None        Tobacco Use    Smoking status: Current Every Day Smoker     Packs/day: 2.00     Types: Cigarettes    Smokeless tobacco: Never Used   Substance and Sexual Activity    Alcohol use: Yes     Alcohol/week: 8.0 standard drinks     Types: 8 Cans of beer per week     Comment: beer daily    Drug use: No    Sexual activity: Not on file         Review of Systems   Constitutional: Positive for activity change and fatigue. Negative for chills and fever.   HENT: Positive for congestion. Negative for sore throat.    Eyes:        Left eye blindness   Respiratory: Positive for cough and shortness of breath.    Cardiovascular: Negative for chest pain, palpitations and leg swelling.   Gastrointestinal: Negative for abdominal pain, diarrhea, nausea and vomiting.   Genitourinary: Negative for difficulty urinating.   Musculoskeletal: Negative for back pain and neck stiffness.   Skin: Negative for wound.   Neurological: Negative for tremors, speech difficulty and headaches.   Psychiatric/Behavioral: The patient is not nervous/anxious.      Objective:     Vital Signs (Most Recent):  Temp: 96.7 °F (35.9 °C) (09/16/20 1505)  Pulse: 66 (09/16/20 1530)  Resp: (!) 22 (09/16/20 1530)  BP: 113/66 (09/16/20 1515)  SpO2: 100 % (09/16/20 1530) Vital Signs (24h Range):  Temp:  [96.5 °F (35.8 °C)-97.5 °F (36.4 °C)] 96.7 °F (35.9 °C)  Pulse:  [49-76] 66  Resp:  [13-35] 22  SpO2:  [97 %-100 %] 100 %  BP: ()/(44-89) 113/66     Weight: 50 kg (110 lb 3.7 oz)  Body mass index is 14.95 kg/m².      Intake/Output Summary (Last 24 hours) at 9/16/2020 1600  Last data filed at 9/16/2020 1505  Gross per 24 hour   Intake 4592.11 ml   Output 250 ml   Net 4342.11 ml      sodium chloride 0.9% 100 mL/hr at 09/16/20 1505          Physical Exam  Vitals signs and nursing note reviewed.   Constitutional:       Appearance: He is cachectic. He is ill-appearing.      Comments: Temporal wasting   HENT:      Mouth/Throat:      Mouth: Mucous membranes are dry.   Eyes:      Comments: Left eye opacity   Cardiovascular:      Rate and Rhythm: Normal rate and regular rhythm.      Pulses:           Radial pulses are 2+ on the right side and 2+ on the left side.        Dorsalis pedis pulses are 2+ on the right side and 2+ on the left side.   Pulmonary:      Effort: Pulmonary effort is normal.      Breath sounds: Normal breath sounds.   Abdominal:      General: Abdomen is flat. Bowel sounds are normal. There is no distension.      Palpations: Abdomen is soft.      Tenderness: There is no abdominal tenderness.   Musculoskeletal:      Right lower leg: No edema.      Left lower leg: No edema.   Skin:     General: Skin is warm and dry.      Capillary Refill: Capillary refill takes 2 to 3 seconds.   Neurological:      Mental Status: He is alert and oriented to person, place, and time.   Psychiatric:         Behavior: Behavior is cooperative.         Vents:       Lines/Drains/Airways     Central Venous Catheter Line            Percutaneous Central Line Insertion/Assessment - Triple Lumen  09/15/20 1756 right internal jugular less than 1 day          Peripheral Intravenous Line                 Peripheral IV - Single Lumen 09/15/20 1555 20 G Left Antecubital 1 day         Peripheral IV - Single Lumen 09/15/20 1555 20 G Right Antecubital 1 day                Significant Labs:    CBC/Anemia Profile:  Recent Labs   Lab 09/15/20  1545 09/16/20  0715   WBC 6.38 5.83   HGB 9.7* 9.7*   HCT 28.4* 27.9*    168   * 100*   RDW 15.1* 14.8*        Chemistries:  Recent Labs   Lab 09/15/20  1545 09/16/20  0715   * 134*   K 4.1 4.1    112*   CO2 15* 16*   BUN 61* 38*   CREATININE 2.7* 1.6*   CALCIUM 8.3* 7.1*   ALBUMIN 2.6* 2.1*   PROT 7.5 6.2    BILITOT 0.1 0.2   ALKPHOS 68 55   ALT 7* <5*   AST 16 10   MG 2.6  --    PHOS 3.6  --        All pertinent labs within the past 24 hours have been reviewed.    Significant Imaging:   I have reviewed all pertinent imaging results/findings within the past 24 hours.

## 2020-09-16 NOTE — ASSESSMENT & PLAN NOTE
-IV antibiotics, inhaled medications and oxygen therapy as needed.   -Check sputum culture.   -Follow up blood cultures.   -ST evaluation.

## 2020-09-16 NOTE — PLAN OF CARE
OT evaluation completed. Patient completing simple grooming with setup. Bed mobility with CGA. Patient completing sit>stand with CGA. In agreement to continued care via OT. Reports that he plans to go home with his son rather than home alone at d/c. If 24/7 SPV is able: HHOT at d/c is appropriate.

## 2020-09-16 NOTE — PLAN OF CARE
ST completed swallow eval with pt exhibiting overall swallow weakness with no overt signs of aspiration.  Silent aspiration can not be excluded at bedside.  An MBSS is recommended to further assess swallow safety and function.

## 2020-09-16 NOTE — ASSESSMENT & PLAN NOTE
Expresses recent decrease and active desire to quit  Librium for DT prophylaxis  Monitor CIWA AR scale  Offer outpatient cessation support resources prior to discharge

## 2020-09-16 NOTE — PLAN OF CARE
Pt alert, oriented x4, follows commands. On RA with sats 100%. SB-SR. Levophed off for the majority of the night, had to turn back on at 0500. Currently infusing at 0.05 mcg/kg/min. Small BM overnight. Voids per urinal. Afebrile. Took for CT chest this am. Pt repositions self more frequently than q2hrs. Will report to oncoming RN.

## 2020-09-16 NOTE — SUBJECTIVE & OBJECTIVE
Past Medical History:   Diagnosis Date    Blind left eye        Past Surgical History:   Procedure Laterality Date    ABDOMINAL SURGERY         Review of patient's allergies indicates:  No Known Allergies    No current facility-administered medications on file prior to encounter.      Current Outpatient Medications on File Prior to Encounter   Medication Sig    albuterol (PROVENTIL/VENTOLIN HFA) 90 mcg/actuation inhaler Inhale 2 puffs into the lungs every 6 (six) hours as needed for Wheezing.    naproxen (NAPROSYN) 500 MG tablet Take 1 tablet (500 mg total) by mouth 2 (two) times daily with meals.    amoxicillin-clavulanate 875-125mg (AUGMENTIN) 875-125 mg per tablet Take 1 tablet by mouth 2 (two) times daily.    azithromycin (ZITHROMAX Z-GEORGES) 250 MG tablet 2 tabs po d #1, then 1 po d #2-5    lisinopriL 10 MG tablet      Family History     None        Tobacco Use    Smoking status: Current Every Day Smoker     Packs/day: 2.00     Types: Cigarettes    Smokeless tobacco: Never Used   Substance and Sexual Activity    Alcohol use: Yes     Alcohol/week: 8.0 standard drinks     Types: 8 Cans of beer per week     Comment: beer daily    Drug use: No    Sexual activity: Not on file     Review of Systems   Constitutional: Negative for appetite change, chills, diaphoresis, fatigue and fever.   HENT: Negative for congestion, ear pain, mouth sores, sore throat and trouble swallowing.    Eyes: Negative for pain and visual disturbance.   Respiratory: Negative for cough, chest tightness and shortness of breath.    Cardiovascular: Negative for chest pain, palpitations and leg swelling.   Gastrointestinal: Negative for abdominal pain, constipation, diarrhea and nausea.   Endocrine: Negative for cold intolerance, heat intolerance, polydipsia and polyuria.   Genitourinary: Negative for dysuria, frequency and hematuria.   Musculoskeletal: Negative for arthralgias, back pain, myalgias and neck pain.   Skin: Negative for  pallor, rash and wound.   Allergic/Immunologic: Negative for environmental allergies and immunocompromised state.   Neurological: Positive for dizziness and weakness. Negative for seizures, syncope, numbness and headaches.   Hematological: Negative for adenopathy. Does not bruise/bleed easily.   Psychiatric/Behavioral: Negative for agitation, confusion and sleep disturbance.     Objective:     Vital Signs (Most Recent):  Temp: 97 °F (36.1 °C) (09/15/20 2305)  Pulse: (!) 58 (09/16/20 0105)  Resp: 13 (09/16/20 0105)  BP: (!) 102/58 (09/16/20 0105)  SpO2: 100 % (09/16/20 0105) Vital Signs (24h Range):  Temp:  [96.8 °F (36 °C)-97.7 °F (36.5 °C)] 97 °F (36.1 °C)  Pulse:  [49-78] 58  Resp:  [13-35] 13  SpO2:  [95 %-100 %] 100 %  BP: ()/(38-89) 102/58     Weight: 50 kg (110 lb 3.7 oz)  Body mass index is 14.95 kg/m².    Physical Exam  Vitals signs and nursing note reviewed.   Constitutional:       Appearance: He is cachectic. He is ill-appearing.   HENT:      Head: Normocephalic and atraumatic.   Eyes:      Conjunctiva/sclera: Conjunctivae normal.   Neck:      Musculoskeletal: Neck supple.      Vascular: No JVD.   Cardiovascular:      Rate and Rhythm: Normal rate and regular rhythm.      Heart sounds: Normal heart sounds.   Pulmonary:      Effort: Pulmonary effort is normal.      Breath sounds: Normal breath sounds. No wheezing.   Abdominal:      General: Bowel sounds are normal. There is no distension.      Palpations: Abdomen is soft.      Tenderness: There is no abdominal tenderness.   Musculoskeletal: Normal range of motion.   Skin:     General: Skin is warm and dry.      Findings: No rash.      Comments: Pruritic rash with skin discoloration to bilateral upper extremities.    Neurological:      Mental Status: He is oriented to person, place, and time.   Psychiatric:         Behavior: Behavior normal.         Thought Content: Thought content normal.             Significant Labs:   CBC:   Recent Labs   Lab  09/15/20  1545   WBC 6.38   HGB 9.7*   HCT 28.4*        CMP:   Recent Labs   Lab 09/15/20  1545   *   K 4.1      CO2 15*   GLU 92   BUN 61*   CREATININE 2.7*   CALCIUM 8.3*   PROT 7.5   ALBUMIN 2.6*   BILITOT 0.1   ALKPHOS 68   AST 16   ALT 7*   ANIONGAP 13   EGFRNONAA 22.4*     Troponin:   Recent Labs   Lab 09/15/20  1545   TROPONINI 0.062*     All pertinent labs within the past 24 hours have been reviewed.    Significant Imaging: I have reviewed all pertinent imaging results/findings within the past 24 hours.

## 2020-09-16 NOTE — PT/OT/SLP EVAL
"Occupational Therapy   Evaluation    Name: Ty López  MRN: [id number]  Admitting Diagnosis:  Septic shock      Recommendations:     Discharge Recommendations: home health OT  Discharge Equipment Recommendations:  bedside commode, bath bench, walker, rolling  Barriers to discharge:  None    Assessment:     Ty López is a 75 y.o. male with a medical diagnosis of Septic shock.  He presents with the following performance deficits affecting function: weakness, visual deficits, impaired endurance, impaired self care skills, impaired functional mobilty, impaired balance, decreased upper extremity function.      Rehab Prognosis: Good; patient would benefit from acute skilled OT services to address these deficits and reach maximum level of function.       Plan:     Patient to be seen 3 x/week to address the above listed problems via self-care/home management, therapeutic activities, therapeutic exercises  · Plan of Care Expires: 09/23/20  · Plan of Care Reviewed with: patient    Subjective     Chief Complaint: Reported "I am ready to go home."  Patient/Family Comments/goals: Return home at [facility].    Occupational Profile:  Living Environment: Patient resides alone, but with plans to d/c home with son. Son's home is a mobile home with steps to enter.   Previous level of function: Prior to hospitalization, patient was fully independent with ADLs and ambulation. Admits to frequent falls and states that he had given up driving.  Equipment Used at Home:  none  Assistance upon Discharge: Patient to d/c home with son.    Pain/Comfort:  · Pain Rating 1: 0/10    Patients cultural, spiritual, Spiritism conflicts given the current situation:      Objective:     Communicated with: NurseKarli, prior to session.  Patient found left sidelying with blood pressure cuff, central line, peripheral IV, pulse ox (continuous), telemetry upon OT entry to room.    General Precautions: Standard, fall   Orthopedic Precautions:N/A "   Braces: N/A     Occupational Performance:    Bed Mobility:    · Patient completed Supine to Sit with contact guard assistance  · Patient completed Sit to Supine with contact guard assistance    Functional Mobility/Transfers:  · Patient completed Sit <> Stand Transfer with contact guard assistance  with  no assistive device   · Functional Mobility: Limited assessment 2/2 multiple lines. Demonstrated ability to side step to HOB with min hand held assistance for repositioning.    Activities of Daily Living:  · Grooming: supervision setup  · Upper Body Dressing: moderate assistance (limited 2/2 lines)    Cognitive/Visual Perceptual:  Cognitive/Psychosocial Skills:     -       Oriented to: Person, Place and Situation   -       Follows Commands/attention:Follows one-step commands    Physical Exam:  Balance:    -       sitting balance: good, static standing: fair, dynamic standing balance: poor +  Upper Extremity Range of Motion:     -       Right Upper Extremity: WFL  -       Left Upper Extremity: WFL  Upper Extremity Strength:    -       Right Upper Extremity: grossly 4-5  -       Left Upper Extremity: grossly 4/5    AMPAC 6 Click ADL:  AMPAC Total Score: 17    Treatment & Education:  Patient provided with education and role of OT in acute setting and benefits of participation. Patient sat EOB x5 mins and stood x2 mins to increase activity tolerance and balance needed to increase engagement in ADLs.  Education:    Patient left right sidelying with all lines intact and call button in reach    GOALS:   Multidisciplinary Problems     Occupational Therapy Goals        Problem: Occupational Therapy Goal    Goal Priority Disciplines Outcome Interventions   Occupational Therapy Goal     OT, PT/OT                     History:     Past Medical History:   Diagnosis Date    Blind left eye        Past Surgical History:   Procedure Laterality Date    ABDOMINAL SURGERY         Time Tracking:     OT Date of Treatment: 09/16/20  OT  Start Time: 1350  OT Stop Time: 1415  OT Total Time (min): 25 min    Billable Minutes:Evaluation 15  Therapeutic Activity 10    Giselle Michel OT  9/16/2020

## 2020-09-16 NOTE — ED NOTES
Unable to obtain oral temperature, warming blankets provided. Pt AAOx3 and denies being cold. Comfort measures provided.

## 2020-09-16 NOTE — ASSESSMENT & PLAN NOTE
Prerenal s/t septic shock, volume depletion  Continue IVF resuscitation  Keep MAP > 65  Strict I/O  Monitor creatinine  Avoid nephrotoxins

## 2020-09-16 NOTE — CONSULTS
[facility] -   Adult Nutrition  Consult Note    SUMMARY     Recommendations    Recommendation:   1. Continue regular diet, and add Boost Plus TID   2. RD to f/u    Goals: % meal intake + Boost Plus by RD f/u  Nutrition Goal Status: new  Communication of RD Recs: POC, sticky note    Reason for Assessment    Reason For Assessment: consult  Diagnosis: infection/sepsis  Relevant Medical History: blind in left eye  Interdisciplinary Rounds: attended    General Information Comments: RD consulted due to emaciated pt. He presented due to weakness, lasting for months. Following the deaths of his daughter and wife a few years ago, he says that he has not been taking care of himself. He also said that he typically fears going to the doctor. He reports drinking excessively for years, and recently reduced his intake, though it is still excessive, and he has been barely eating. He plans to move in with his son and take better care of himself- he does not like feeling so weak, like he can barely sit up. He is on a regular diet and has had excellent intake and RD provided nutrition education on healthy intake. NFPE- he meets criteria for severe malnutrition, documented in greater detail below.     Nutrition Discharge Planning: regular diet     Nutrition Risk Screen    Nutrition Risk Screen: other (see comments)(emaciated)    Nutrition/Diet History    Factors Affecting Nutritional Intake: decreased appetite, depression, excessive alcohol intake    Anthropometrics    Temp: 96.5 °F (35.8 °C)  Height: 6' (182.9 cm)  Height (inches): 72 in  Weight Method: Estimated(unable to stand)  Weight: 50 kg (110 lb 3.7 oz)  Weight (lb): 110.23 lb  Ideal Body Weight (IBW), Male: 178 lb  BMI Grade: less than 16 protein-energy malnutrition grade III       Lab/Procedures/Meds  Pertinent Labs: reviewed  Albumin 2.1, EGFR 49. BUN 38, Cr 1.6, Na 134  Pertinent Medications: reviewed  Pepcid, B1, Heparin, MVI, B9, NaCl    Physical  Findings/Assessment     Pt is emaciated. He has areas of altered skin integrity and is too weak to get out of bed.     Estimated/Assessed Needs    Weight Used For Calorie Calculations: 50 kg (110 lb 3.7 oz)  Energy Calorie Requirements (kcal): 1500- 1750  Energy Need Method: Kcal/kg(30- 35g/kg per malnutrition)  Protein Requirements: 60- 75g(1.2- 1.5g/kg per malnutrition)  Weight Used For Protein Calculations: 50 kg (110 lb 3.7 oz)     Estimated Fluid Requirement Method: RDA Method(or per MD)  RDA Method (mL): 1500  CHO Requirement: 240g    Nutrition Prescription Ordered    Current Diet Order: regular    Evaluation of Received Nutrient/Fluid Intake       Intake/Output Summary (Last 24 hours) at 9/16/2020 1058  Last data filed at 9/16/2020 0900  Gross per 24 hour   Intake 3501.24 ml   Output 250 ml   Net 3251.24 ml     % Intake of Estimated Energy Needs: 75 - 100 %  % Meal Intake: 75 - 100 %    Nutrition Risk    2x week    Assessment and Plan    Severe malnutrition  Nutrition Problem:  Severe Protein-Calorie Malnutrition  Malnutrition in the context of Social/Environmental Circumstances    Related to (etiology):  depression    Signs and Symptoms (as evidenced by):  Energy Intake: <75% of estimated energy requirement for > 3months  Body Fat Depletion: moderate and severe depletion of orbitals and triceps   Muscle Mass Depletion: severe depletion of temples, clavicle region, interosseous muscle and lower extremities     Interventions(treatment strategy):  Nutrition education content  Collaboration with other providers  Boost Plus TID    Nutrition Diagnosis Status:  New       Monitor and Evaluation    Food and Nutrient Intake: food and beverage intake  Food and Nutrient Adminstration: diet order  Physical Activity and Function: nutrition-related ADLs and IADLs  Anthropometric Measurements: weight  Biochemical Data, Medical Tests and Procedures: electrolyte and renal panel, gastrointestinal profile, glucose/endocrine  profile, inflammatory profile, lipid profile  Nutrition-Focused Physical Findings: overall appearance     Malnutrition Assessment  Malnutrition Type: social/environmental circumstances  Energy Intake: severe energy intake  Skin (Micronutrient): bruised, cuts unhealed  Teeth (Micronutrient): broken dentition           Orbital Region (Subcutaneous Fat Loss): severe depletion  Upper Arm Region (Subcutaneous Fat Loss): severe depletion   [place] (Muscle Loss): moderate depletion  Clavicle Bone Region (Muscle Loss): severe depletion  Dorsal Hand (Muscle Loss): severe depletion  Patellar Region (Muscle Loss): severe depletion  Posterior Calf Region (Muscle Loss): severe depletion       Subcutaneous Fat Loss (Final Summary): severe protein-calorie malnutrition  Muscle Loss Evaluation (Final Summary): severe protein-calorie malnutrition         Nutrition Follow-Up    RD Follow-up?: Yes    Thanks!  Christine Meléndez MPH RD LDN

## 2020-09-16 NOTE — PLAN OF CARE
Pt in bed A&Ox4, no c/o pain at this time. Pt having multiple loose BM throughout the day. Speech therapy assess pt, pt able to noa meals well, diet change to mechanical soft. Levophed gtt titrate off about 1520, will monitor BP. Pt work with PT today, able to get OOB to chair. Pt turns self in bed independently. Pt daughter call for update. Pt scheduled to have barium swallow 9/17@1030.

## 2020-09-16 NOTE — ASSESSMENT & PLAN NOTE
Likely multifactorial with malnutrition and ETOH abuse  Continue IV hydration  Encourage ETOH cessation and optimization of nutrition

## 2020-09-16 NOTE — PROGRESS NOTES
Pharmacokinetic Initial Assessment: IV Vancomycin    Assessment/Plan:    Initiate intravenous vancomycin with loading dose of 1000 mg once with subsequent doses when random concentrations are less than 20 mcg/mL  Desired empiric serum trough concentration is 15 to 20 mcg/mL  Draw vancomycin random level on 9/16 at 1800.  Pharmacy will continue to follow and monitor vancomycin.      Please contact pharmacy at extension [phone] with any questions regarding this assessment.     Thank you for the consult,   Rosmery Harmon       Patient brief summary:  Ty López is a 75 y.o. male initiated on antimicrobial therapy with IV Vancomycin for treatment of suspected lower respiratory infection    Drug Allergies:   Review of patient's allergies indicates:  No Known Allergies    Actual Body Weight:   50kg    Renal Function:   Estimated Creatinine Clearance: 17.2 mL/min (A) (based on SCr of 2.7 mg/dL (H)).,     Dialysis Method (if applicable):  N/A    CBC (last 72 hours):  Recent Labs   Lab Result Units 09/15/20  1545   WBC K/uL 6.38   Hemoglobin g/dL 9.7*   Hematocrit % 28.4*   Platelets K/uL 185   Gran% % 57.3   Lymph% % 27.6   Mono% % 12.7   Eosinophil% % 1.4   Basophil% % 0.5   Differential Method  Automated       Metabolic Panel (last 72 hours):  Recent Labs   Lab Result Units 09/15/20  1545 09/15/20  1720   Sodium mmol/L 131*  --    Potassium mmol/L 4.1  --    Chloride mmol/L 103  --    CO2 mmol/L 15*  --    Glucose mg/dL 92  --    Glucose, UA   --  Negative   BUN, Bld mg/dL 61*  --    Creatinine mg/dL 2.7*  --    Creatinine, Random Ur mg/dL  --  43.7   Albumin g/dL 2.6*  --    Total Bilirubin mg/dL 0.1  --    Alkaline Phosphatase U/L 68  --    AST U/L 16  --    ALT U/L 7*  --    Magnesium mg/dL 2.6  --    Phosphorus mg/dL 3.6  --        Drug levels (last 3 results):  No results for input(s): VANCOMYCINRA, VANCOMYCINPE, VANCOMYCINTR in the last 72 hours.    Microbiologic Results:  Microbiology Results (last 7 days)      Procedure Component Value Units Date/Time    Urine culture [[id number]] Collected: 09/15/20 1720    Order Status: No result Specimen: Urine Updated: 09/15/20 1740    Influenza A & B by Molecular [[id number]] Collected: 09/15/20 1555    Order Status: Completed Specimen: Nasopharyngeal Swab Updated: 09/15/20 1644     Influenza A, Molecular Negative     Influenza B, Molecular Negative     Flu A & B Source NP    Blood culture x two cultures. Draw prior to antibiotics. [[id number]] Collected: 09/15/20 1557    Order Status: Sent Specimen: Blood from Peripheral, Antecubital, Right Updated: 09/15/20 1624    Blood culture x two cultures. Draw prior to antibiotics. [[id number]] Collected: 09/15/20 1545    Order Status: Sent Specimen: Blood from Peripheral, Antecubital, Left Updated: 09/15/20 1623

## 2020-09-16 NOTE — ASSESSMENT & PLAN NOTE
Suspicious for aspiration  Empiric abx  Encourage TCDB and mobilization  Supplemental oxygen prn to keep SAT > 92

## 2020-09-16 NOTE — ASSESSMENT & PLAN NOTE
Nutrition Problem:  Severe Protein-Calorie Malnutrition  Malnutrition in the context of Social/Environmental Circumstances    Related to (etiology):  depression    Signs and Symptoms (as evidenced by):  Energy Intake: <75% of estimated energy requirement for > 3months  Body Fat Depletion: moderate and severe depletion of orbitals and triceps   Muscle Mass Depletion: severe depletion of temples, clavicle region, interosseous muscle and lower extremities     Interventions(treatment strategy):  Nutrition education content  Collaboration with other providers  Boost Plus TID    Nutrition Diagnosis Status:  New

## 2020-09-16 NOTE — PLAN OF CARE
P.T. EVAL COMPLETE, PT CURRENTLY REQUIRES SBA FOR BED MOBILITY, CGA FOR TF'S AND GAIT.  P.T. RECOMMENDS HHPT WITH FAMILY ASSISTANCE

## 2020-09-16 NOTE — ASSESSMENT & PLAN NOTE
Source - RUL pneumonia, UTI  Empiric antimicrobial coverage - zosyn, vancomycin  Blood and urine cultures pending; culture sputum if able to obtain  Pressor prn to keep MAP > 65  Continue IV hydration

## 2020-09-16 NOTE — PT/OT/SLP EVAL
"Speech Language Pathology Evaluation  Bedside Swallow    Patient Name:  Ty López   MRN:  [id number]  Admitting Diagnosis: Septic shock    Recommendations:                 General Recommendations:  Dysphagia therapy and Modified barium swallow study  Diet recommendations:  Mechanical soft, Thin   Aspiration Precautions: 1 bite/sip at a time, Double swallow with each bite/sip, HOB to 90 degrees and Remain upright 30 minutes post meal   General Precautions: Standard, aspiration, fall, respiratory    History:     Past Medical History:   Diagnosis Date    Blind left eye        Past Surgical History:   Procedure Laterality Date    ABDOMINAL SURGERY         Subjective   Pt seen at bedside with no family present.  He reports living by himself, having a poor appetite, losing weight, and "just giving up".    Patient goals: none stated      Pain/Comfort:  · Pain Rating 1: 0/10    Objective:     Oral Musculature Evaluation  · Oral Musculature: general weakness  · Dentition: scattered dentition    Bedside Swallow Eval:   Consistencies Assessed:  · Thin liquids, puree, and soft solids      Oral Phase:   · pt needed liquids to help with mastication due to poor dentition  · Lingual residue  · Slow oral transit time    Pharyngeal Phase:   · Coughing/choking - delayed  · decreased hyolaryngeal excursion to palpation  · multiple spontaneous swallows    Assessment:     Ty López is a 75 y.o. male with an SLP diagnosis of Dysphagia.  ST completed swallow eval with pt exhibiting overall swallow weakness with no overt signs of aspiration.  Silent aspiration can not be excluded at bedside.  An MBSS is recommended to further assess swallow safety and function. Pt will benefit from ongoing ST and further swallow assessment.    Goals:   Multidisciplinary Problems     SLP Goals        Problem: SLP Goal    Goal Priority Disciplines Outcome   SLP Goal     SLP    Description: 1. Pt will complete oral and pharyngeal ex's with min A " for increased strength and ROM  2. Pt will tolerate least restrictive diet while maintaining airway integrity                    Plan:     · Patient to be seen:  2 x/week   · Plan of Care expires:  09/23/20  · Plan of Care reviewed with:  patient   · SLP Follow-Up:  Yes      Time Tracking:     SLP Treatment Date:   09/16/20  Speech Start Time:  0945  Speech Stop Time:  1000     Speech Total Time (min):  15 min    Billable Minutes: Eval Swallow and Oral Function 15    Amy García CCC-SLP  09/16/2020

## 2020-09-16 NOTE — H&P
[facility] Hospital Medicine  History & Physical    Patient Name: Ty López  MRN: [id number]  Admission Date: 9/15/2020  Attending Physician: Cornelius Vu, *   Primary Care Provider: Carmen Rangel MD         Patient information was obtained from patient, past medical records and ER records.     Subjective:     Principal Problem:Septic shock    Chief Complaint:   Chief Complaint   Patient presents with    Fatigue     SOB, cough and weakness, daughter says he can't walk due to weakness. normally ambulatory.         HPI: Ty López is a 75 year old male with no known past medical history who presented to the [facility] ED with complaints of dizziness and findings of hypotension. The patient reports being dizzy for months. There is associated weakness that has prevented him from driving. He denies other symptoms including cough, fever, shortness of breath, dysphagia, . The patient reports that for the last three years, he has been drinking at least 6 beers and a pint of gin daily. He used to drink 24 beers, but has cut back in the last several months. In the ED, the patient was found to have a right suprahilar infiltrate on chest x-ray. Labs were significant for an alcohol level of 160, initial lactic acid of 3.5 with a repeat of 1.4, sodium of 131, creatinine of 2.7 with a previous measurement of 0.7 on 4/20/20 and troponin of 0.062. The patient is a full code. His daughter is his surrogate medical decision maker.     Past Medical History:   Diagnosis Date    Blind left eye        Past Surgical History:   Procedure Laterality Date    ABDOMINAL SURGERY         Review of patient's allergies indicates:  No Known Allergies    No current facility-administered medications on file prior to encounter.      Current Outpatient Medications on File Prior to Encounter   Medication Sig    albuterol (PROVENTIL/VENTOLIN HFA) 90 mcg/actuation inhaler Inhale 2 puffs into the lungs every 6  (six) hours as needed for Wheezing.    naproxen (NAPROSYN) 500 MG tablet Take 1 tablet (500 mg total) by mouth 2 (two) times daily with meals.    amoxicillin-clavulanate 875-125mg (AUGMENTIN) 875-125 mg per tablet Take 1 tablet by mouth 2 (two) times daily.    azithromycin (ZITHROMAX Z-GEORGES) 250 MG tablet 2 tabs po d #1, then 1 po d #2-5    lisinopriL 10 MG tablet      Family History     None        Tobacco Use    Smoking status: Current Every Day Smoker     Packs/day: 2.00     Types: Cigarettes    Smokeless tobacco: Never Used   Substance and Sexual Activity    Alcohol use: Yes     Alcohol/week: 8.0 standard drinks     Types: 8 Cans of beer per week     Comment: beer daily    Drug use: No    Sexual activity: Not on file     Review of Systems   Constitutional: Negative for appetite change, chills, diaphoresis, fatigue and fever.   HENT: Negative for congestion, ear pain, mouth sores, sore throat and trouble swallowing.    Eyes: Negative for pain and visual disturbance.   Respiratory: Negative for cough, chest tightness and shortness of breath.    Cardiovascular: Negative for chest pain, palpitations and leg swelling.   Gastrointestinal: Negative for abdominal pain, constipation, diarrhea and nausea.   Endocrine: Negative for cold intolerance, heat intolerance, polydipsia and polyuria.   Genitourinary: Negative for dysuria, frequency and hematuria.   Musculoskeletal: Negative for arthralgias, back pain, myalgias and neck pain.   Skin: Negative for pallor, rash and wound.   Allergic/Immunologic: Negative for environmental allergies and immunocompromised state.   Neurological: Positive for dizziness and weakness. Negative for seizures, syncope, numbness and headaches.   Hematological: Negative for adenopathy. Does not bruise/bleed easily.   Psychiatric/Behavioral: Negative for agitation, confusion and sleep disturbance.     Objective:     Vital Signs (Most Recent):  Temp: 97 °F (36.1 °C) (09/15/20 2305)  Pulse:  (!) 58 (09/16/20 0105)  Resp: 13 (09/16/20 0105)  BP: (!) 102/58 (09/16/20 0105)  SpO2: 100 % (09/16/20 0105) Vital Signs (24h Range):  Temp:  [96.8 °F (36 °C)-97.7 °F (36.5 °C)] 97 °F (36.1 °C)  Pulse:  [49-78] 58  Resp:  [13-35] 13  SpO2:  [95 %-100 %] 100 %  BP: ()/(38-89) 102/58     Weight: 50 kg (110 lb 3.7 oz)  Body mass index is 14.95 kg/m².    Physical Exam  Vitals signs and nursing note reviewed.   Constitutional:       Appearance: He is cachectic. He is ill-appearing.   HENT:      Head: Normocephalic and atraumatic.   Eyes:      Conjunctiva/sclera: Conjunctivae normal.   Neck:      Musculoskeletal: Neck supple.      Vascular: No JVD.   Cardiovascular:      Rate and Rhythm: Normal rate and regular rhythm.      Heart sounds: Normal heart sounds.   Pulmonary:      Effort: Pulmonary effort is normal.      Breath sounds: Normal breath sounds. No wheezing.   Abdominal:      General: Bowel sounds are normal. There is no distension.      Palpations: Abdomen is soft.      Tenderness: There is no abdominal tenderness.   Musculoskeletal: Normal range of motion.   Skin:     General: Skin is warm and dry.      Findings: No rash.      Comments: Pruritic rash with skin discoloration to bilateral upper extremities.    Neurological:      Mental Status: He is oriented to person, place, and time.   Psychiatric:         Behavior: Behavior normal.         Thought Content: Thought content normal.             Significant Labs:   CBC:   Recent Labs   Lab 09/15/20  1545   WBC 6.38   HGB 9.7*   HCT 28.4*        CMP:   Recent Labs   Lab 09/15/20  1545   *   K 4.1      CO2 15*   GLU 92   BUN 61*   CREATININE 2.7*   CALCIUM 8.3*   PROT 7.5   ALBUMIN 2.6*   BILITOT 0.1   ALKPHOS 68   AST 16   ALT 7*   ANIONGAP 13   EGFRNONAA 22.4*     Troponin:   Recent Labs   Lab 09/15/20  1545   TROPONINI 0.062*     All pertinent labs within the past 24 hours have been reviewed.    Significant Imaging: I have reviewed all  pertinent imaging results/findings within the past 24 hours.    Assessment/Plan:     * Septic shock  -Levophed being weaned.   -Continue hydration, IV antibiotics and supportive care.       Pneumonia of right middle lobe due to infectious organism  -IV antibiotics, inhaled medications and oxygen therapy as needed.   -Check sputum culture.   -Follow up blood cultures.   -ST evaluation.         LOPEZ (acute kidney injury)  -Likely due to hypoperfusion associated with shock.   -Continue IV fluids and monitor.       Hyponatremia  -Due to alcoholism and hypovolemia.   -Continue hydration and monitor.       Elevated troponin  -Possibly due to renal failure.   -Trend troponin.   -ASA daily.       Alcohol abuse  -Patient counseled on cessation.   -Ativan for DT symptoms and Librium for DT prophylaxis.   -MVI, thiamine and folic acid.         VTE Risk Mitigation (From admission, onward)         Ordered     heparin (porcine) injection 5,000 Units  Every 8 hours      09/16/20 0303     IP VTE HIGH RISK PATIENT  Once      09/16/20 0303              Critical care time spent on the evaluation and treatment of severe organ dysfunction, review of pertinent labs and imaging studies, discussions with consulting providers and discussions with patient/family: Greater than 60 minutes.     NOREEN Boyce  Department of Hospital Medicine   [facility]

## 2020-09-16 NOTE — PLAN OF CARE
CM spoke with pt to complete initial assessment. 75 year old male admitted with septic shock. Pt lives alone. Stated that he has spoken to his son Ty López Jr. and he feels he may need to discharge home with Ty Paulson until he is recovers. Pt has no DME in home and requested a cane. Pt independent of ADLs at home, but needs  Stand by assist and contact guard. Will speak to pt after therapy recommendations are in. Updated white board with CM contact information provided. Transitional care folder given to pt, information on bedside delivery, [facility], discharge begins on admit pamphlet, and  advance directive information provided to pt. Instructed to call CM with questions or concerns.       D/c plan: home with family versus home with home health  Transport home: family  PCP: Sancho  Preferred pharmacy:[facility] ([facility])  Bedside delivery: refused  [facility]:  refused       09/16/20 1100   Discharge Assessment   Assessment Type Discharge Planning Assessment   Confirmed/corrected address and phone number on facesheet? Yes   Assessment information obtained from? Patient   Expected Length of Stay (days)   (TBD)   Communicated expected length of stay with patient/caregiver yes   Prior to hospitilization cognitive status: Alert/Oriented;No Deficits   Prior to hospitalization functional status: Independent   Current cognitive status: Alert/Oriented   Current Functional Status: Needs Assistance   Facility Arrived From: [facility] ED   Lives With alone   Able to Return to Prior Arrangements other (see comments)  (TBD)   Is patient able to care for self after discharge? Unable to determine at this time (comments)   Who are your caregiver(s) and their phone number(s)? Claudia López, daughter, [phone]   Patient's perception of discharge disposition home or selfcare   Readmission Within the Last 30 Days no previous admission in last 30 days   Patient currently being followed by outpatient case  management? No   Patient currently receives any other outside agency services? No   Equipment Currently Used at Home none   Part D Coverage na   Do you have any problems affording any of your prescribed medications? Yes   If yes, what medications? please review all medications   Is the patient taking medications as prescribed? yes   Does the patient have transportation home? Yes   Transportation Anticipated family or friend will provide   Dialysis Name and Scheduled days NA   Does the patient receive services at the [facility]? No   Discharge Plan A Home;Home with family   Discharge Plan B Home Health;Home;Home with family   DME Needed Upon Discharge  other (see comments)  (pt requested cane, may benefit from additional DME based on PT/OT recommendations)   Patient/Family in Agreement with Plan yes

## 2020-09-17 PROBLEM — R06.02 SOB (SHORTNESS OF BREATH): Status: ACTIVE | Noted: 2020-09-17

## 2020-09-17 PROBLEM — A41.9 SEPTIC SHOCK: Status: RESOLVED | Noted: 2020-09-16 | Resolved: 2020-09-17

## 2020-09-17 PROBLEM — R65.21 SEPTIC SHOCK: Status: RESOLVED | Noted: 2020-09-16 | Resolved: 2020-09-17

## 2020-09-17 LAB
ACANTHOCYTES BLD QL SMEAR: PRESENT
ANION GAP SERPL CALC-SCNC: 5 MMOL/L (ref 8–16)
ANISOCYTOSIS BLD QL SMEAR: SLIGHT
BACTERIA UR CULT: NO GROWTH
BASOPHILS # BLD AUTO: 0.01 K/UL (ref 0–0.2)
BASOPHILS NFR BLD: 0.2 % (ref 0–1.9)
BUN SERPL-MCNC: 25 MG/DL (ref 8–23)
CALCIUM SERPL-MCNC: 7.5 MG/DL (ref 8.7–10.5)
CHLORIDE SERPL-SCNC: 115 MMOL/L (ref 95–110)
CO2 SERPL-SCNC: 16 MMOL/L (ref 23–29)
CREAT SERPL-MCNC: 0.8 MG/DL (ref 0.5–1.4)
DACRYOCYTES BLD QL SMEAR: ABNORMAL
DIFFERENTIAL METHOD: ABNORMAL
EOSINOPHIL # BLD AUTO: 0.1 K/UL (ref 0–0.5)
EOSINOPHIL NFR BLD: 1.9 % (ref 0–8)
ERYTHROCYTE [DISTWIDTH] IN BLOOD BY AUTOMATED COUNT: 15 % (ref 11.5–14.5)
EST. GFR  (AFRICAN AMERICAN): >60 ML/MIN/1.73 M^2
EST. GFR  (NON AFRICAN AMERICAN): >60 ML/MIN/1.73 M^2
GLUCOSE SERPL-MCNC: 75 MG/DL (ref 70–110)
HCT VFR BLD AUTO: 27.7 % (ref 40–54)
HGB BLD-MCNC: 9.4 G/DL (ref 14–18)
IMM GRANULOCYTES # BLD AUTO: 0.02 K/UL (ref 0–0.04)
IMM GRANULOCYTES NFR BLD AUTO: 0.3 % (ref 0–0.5)
LYMPHOCYTES # BLD AUTO: 1.2 K/UL (ref 1–4.8)
LYMPHOCYTES NFR BLD: 19.4 % (ref 18–48)
MAGNESIUM SERPL-MCNC: 1.8 MG/DL (ref 1.6–2.6)
MCH RBC QN AUTO: 33.8 PG (ref 27–31)
MCHC RBC AUTO-ENTMCNC: 33.9 G/DL (ref 32–36)
MCV RBC AUTO: 100 FL (ref 82–98)
MONOCYTES # BLD AUTO: 0.7 K/UL (ref 0.3–1)
MONOCYTES NFR BLD: 11.1 % (ref 4–15)
NEUTROPHILS # BLD AUTO: 4 K/UL (ref 1.8–7.7)
NEUTROPHILS NFR BLD: 67.1 % (ref 38–73)
NRBC BLD-RTO: 0 /100 WBC
OVALOCYTES BLD QL SMEAR: ABNORMAL
PHOSPHATE SERPL-MCNC: 2.6 MG/DL (ref 2.7–4.5)
PLATELET # BLD AUTO: 157 K/UL (ref 150–350)
PLATELET BLD QL SMEAR: ABNORMAL
PMV BLD AUTO: 11.3 FL (ref 9.2–12.9)
POIKILOCYTOSIS BLD QL SMEAR: SLIGHT
POTASSIUM SERPL-SCNC: 4.2 MMOL/L (ref 3.5–5.1)
RBC # BLD AUTO: 2.78 M/UL (ref 4.6–6.2)
SODIUM SERPL-SCNC: 136 MMOL/L (ref 136–145)
WBC # BLD AUTO: 5.92 K/UL (ref 3.9–12.7)

## 2020-09-17 PROCEDURE — 83735 ASSAY OF MAGNESIUM: CPT

## 2020-09-17 PROCEDURE — 97110 THERAPEUTIC EXERCISES: CPT | Mod: CQ

## 2020-09-17 PROCEDURE — 25000003 PHARM REV CODE 250: Performed by: FAMILY MEDICINE

## 2020-09-17 PROCEDURE — 80048 BASIC METABOLIC PNL TOTAL CA: CPT

## 2020-09-17 PROCEDURE — 97530 THERAPEUTIC ACTIVITIES: CPT

## 2020-09-17 PROCEDURE — A9698 NON-RAD CONTRAST MATERIALNOC: HCPCS | Performed by: FAMILY MEDICINE

## 2020-09-17 PROCEDURE — 85025 COMPLETE CBC W/AUTO DIFF WBC: CPT

## 2020-09-17 PROCEDURE — 21400001 HC TELEMETRY ROOM

## 2020-09-17 PROCEDURE — 84100 ASSAY OF PHOSPHORUS: CPT

## 2020-09-17 PROCEDURE — 63600175 PHARM REV CODE 636 W HCPCS: Performed by: PHYSICIAN ASSISTANT

## 2020-09-17 PROCEDURE — 63600175 PHARM REV CODE 636 W HCPCS: Performed by: FAMILY MEDICINE

## 2020-09-17 PROCEDURE — 25000003 PHARM REV CODE 250: Performed by: PHYSICIAN ASSISTANT

## 2020-09-17 PROCEDURE — 99233 PR SUBSEQUENT HOSPITAL CARE,LEVL III: ICD-10-PCS | Mod: ,,, | Performed by: INTERNAL MEDICINE

## 2020-09-17 PROCEDURE — S0030 INJECTION, METRONIDAZOLE: HCPCS | Performed by: FAMILY MEDICINE

## 2020-09-17 PROCEDURE — 92611 MOTION FLUOROSCOPY/SWALLOW: CPT

## 2020-09-17 PROCEDURE — 25000003 PHARM REV CODE 250: Performed by: NURSE PRACTITIONER

## 2020-09-17 PROCEDURE — 25500020 PHARM REV CODE 255: Performed by: FAMILY MEDICINE

## 2020-09-17 PROCEDURE — 99233 SBSQ HOSP IP/OBS HIGH 50: CPT | Mod: ,,, | Performed by: INTERNAL MEDICINE

## 2020-09-17 RX ORDER — FAMOTIDINE 20 MG/1
20 TABLET, FILM COATED ORAL 2 TIMES DAILY
Status: DISCONTINUED | OUTPATIENT
Start: 2020-09-17 | End: 2020-09-19 | Stop reason: HOSPADM

## 2020-09-17 RX ORDER — CEFEPIME HYDROCHLORIDE 1 G/50ML
2 INJECTION, SOLUTION INTRAVENOUS
Status: DISCONTINUED | OUTPATIENT
Start: 2020-09-17 | End: 2020-09-18

## 2020-09-17 RX ADMIN — SODIUM CHLORIDE: 0.9 INJECTION, SOLUTION INTRAVENOUS at 07:09

## 2020-09-17 RX ADMIN — Medication 100 MG: at 08:09

## 2020-09-17 RX ADMIN — HEPARIN SODIUM 5000 UNITS: 5000 INJECTION INTRAVENOUS; SUBCUTANEOUS at 02:09

## 2020-09-17 RX ADMIN — CEFEPIME HYDROCHLORIDE 2 G: 2 INJECTION, SOLUTION INTRAVENOUS at 08:09

## 2020-09-17 RX ADMIN — CHLORDIAZEPOXIDE HYDROCHLORIDE 10 MG: 10 CAPSULE ORAL at 02:09

## 2020-09-17 RX ADMIN — CHLORDIAZEPOXIDE HYDROCHLORIDE 10 MG: 10 CAPSULE ORAL at 05:09

## 2020-09-17 RX ADMIN — FAMOTIDINE 20 MG: 20 TABLET ORAL at 08:09

## 2020-09-17 RX ADMIN — THERA TABS 1 TABLET: TAB at 08:09

## 2020-09-17 RX ADMIN — BARIUM SULFATE 118 G: 960 POWDER, FOR SUSPENSION ORAL at 10:09

## 2020-09-17 RX ADMIN — METRONIDAZOLE 500 MG: 500 INJECTION, SOLUTION INTRAVENOUS at 09:09

## 2020-09-17 RX ADMIN — HEPARIN SODIUM 5000 UNITS: 5000 INJECTION INTRAVENOUS; SUBCUTANEOUS at 05:09

## 2020-09-17 RX ADMIN — CHLORDIAZEPOXIDE HYDROCHLORIDE 10 MG: 10 CAPSULE ORAL at 12:09

## 2020-09-17 RX ADMIN — MUPIROCIN: 20 OINTMENT TOPICAL at 09:09

## 2020-09-17 RX ADMIN — FOLIC ACID 1 MG: 1 TABLET ORAL at 08:09

## 2020-09-17 RX ADMIN — METRONIDAZOLE 500 MG: 500 INJECTION, SOLUTION INTRAVENOUS at 12:09

## 2020-09-17 RX ADMIN — ASPIRIN 81 MG 81 MG: 81 TABLET ORAL at 08:09

## 2020-09-17 RX ADMIN — MUPIROCIN: 20 OINTMENT TOPICAL at 08:09

## 2020-09-17 RX ADMIN — METRONIDAZOLE 500 MG: 500 INJECTION, SOLUTION INTRAVENOUS at 05:09

## 2020-09-17 NOTE — PROGRESS NOTES
[facility] Hospital Medicine  Progress Note    Patient Name: Ty López  MRN: [id number]  Patient Class: IP- Inpatient   Admission Date: 9/15/2020  Length of Stay: 2 days  Attending Physician: Jorge Larsen MD  Primary Care Provider: ADRIAN Zheng        Subjective:     Principal Problem:Pneumonia of right middle lobe due to infectious organism        HPI:  Ty López is a 75 year old male with no known past medical history who presented to the [facility] ED with complaints of dizziness and findings of hypotension. The patient reports being dizzy for months. There is associated weakness that has prevented him from driving. He denies other symptoms including cough, fever, shortness of breath, dysphagia, . The patient reports that for the last three years, he has been drinking at least 6 beers and a pint of gin daily. He used to drink 24 beers, but has cut back in the last several months. In the ED, the patient was found to have a right suprahilar infiltrate on chest x-ray. Labs were significant for an alcohol level of 160, initial lactic acid of 3.5 with a repeat of 1.4, sodium of 131, creatinine of 2.7 with a previous measurement of 0.7 on 4/20/20 and troponin of 0.062. The patient is a full code. His daughter is his surrogate medical decision maker.     Overview/Hospital Course:  9/17:  Patient off of Levophed.  Stepped down from ICU today.  Discontinue vancomycin.  Continue cefepime and Flagyl to cover aspiration pneumonia.  Patient looks poorly nourished.  States that he will go live with his son whenever discharged from the hospital.    Interval History:  No acute events reported overnight.  Patient weaned off of Levophed.  Stepped down to floor.     Review of Systems   Constitutional: Negative for fatigue and fever.   HENT: Negative for sinus pressure.    Eyes: Negative for visual disturbance.   Respiratory: Negative for shortness of breath.    Cardiovascular: Negative for chest pain.    Gastrointestinal: Negative for nausea and vomiting.   Genitourinary: Negative for difficulty urinating.   Musculoskeletal: Negative for back pain.   Skin: Negative for rash.   Neurological: Positive for weakness. Negative for headaches.   Psychiatric/Behavioral: Negative for confusion.     Objective:     Vital Signs (Most Recent):  Temp: 96.6 °F (35.9 °C)(blankets and heat adjusted) (09/17/20 1413)  Pulse: 70 (09/17/20 1647)  Resp: 18 (09/17/20 1647)  BP: (!) 175/85 (09/17/20 1647)  SpO2: 99 % (09/17/20 1413) Vital Signs (24h Range):  Temp:  [96.6 °F (35.9 °C)-98 °F (36.7 °C)] 96.6 °F (35.9 °C)  Pulse:  [50-83] 70  Resp:  [14-22] 18  SpO2:  [78 %-100 %] 99 %  BP: ()/(34-85) 175/85     Weight: 50 kg (110 lb 3.7 oz)  Body mass index is 14.95 kg/m².    Intake/Output Summary (Last 24 hours) at 9/17/2020 1726  Last data filed at 9/17/2020 0705  Gross per 24 hour   Intake 1470.75 ml   Output 775 ml   Net 695.75 ml      Physical Exam  Constitutional:       General: He is not in acute distress.     Appearance: He is well-developed. He is ill-appearing. He is not diaphoretic.      Comments: Cachectic   HENT:      Head: Normocephalic and atraumatic.   Eyes:      Pupils: Pupils are equal, round, and reactive to light.   Cardiovascular:      Rate and Rhythm: Normal rate and regular rhythm.      Heart sounds: Normal heart sounds. No murmur. No friction rub. No gallop.    Pulmonary:      Effort: Pulmonary effort is normal. No respiratory distress.      Breath sounds: Normal breath sounds. No stridor. No wheezing or rales.   Abdominal:      General: Bowel sounds are normal. There is no distension.      Palpations: Abdomen is soft. There is no mass.      Tenderness: There is no abdominal tenderness. There is no guarding.   Musculoskeletal:      Right lower leg: No edema.      Left lower leg: No edema.   Skin:     General: Skin is warm.      Findings: No erythema.   Neurological:      Mental Status: He is alert and oriented  to person, place, and time.         Significant Labs:   Recent Lab Results       09/17/20  0500   09/16/20  1810        Acanthocytes Present       Anion Gap 5       Aniso Slight       Baso # 0.01       Basophil% 0.2       BUN, Bld 25       Calcium 7.5       Chloride 115       CO2 16       Creatinine 0.8       Differential Method Automated       eGFR if  >60       eGFR if non  >60  Comment:  Calculation used to obtain the estimated glomerular filtration  rate (eGFR) is the CKD-EPI equation.          Eos # 0.1       Eosinophil% 1.9       Glucose 75       Gran # (ANC) 4.0       Gran% 67.1       Hematocrit 27.7       Hemoglobin 9.4       Immature Grans (Abs) 0.02  Comment:  Mild elevation in immature granulocytes is non specific and   can be seen in a variety of conditions including stress response,   acute inflammation, trauma and pregnancy. Correlation with other   laboratory and clinical findings is essential.         Immature Granulocytes 0.3       Lymph # 1.2       Lymph% 19.4       Magnesium 1.8       MCH 33.8       MCHC 33.9              Mono # 0.7       Mono% 11.1       MPV 11.3       nRBC 0       Ovalocytes Occasional       Phosphorus 2.6       Platelet Estimate Appears normal       Platelets 157       Poik Slight       Potassium 4.2       RBC 2.78       RDW 15.0       Sodium 136       Tear Drop Cells Occasional       Vancomycin, Random   6.2     WBC 5.92           All pertinent labs within the past 24 hours have been reviewed.    Significant Imaging: I have reviewed all pertinent imaging results/findings within the past 24 hours.      Assessment/Plan:      * Pneumonia of right middle lobe due to infectious organism  -IV antibiotics, inhaled medications and oxygen therapy as needed.   -Check sputum culture.   -Follow up blood cultures.   -ST evaluation.     9/17:  Currently on cefepime and Flagyl  Initial procalcitonin negative  Will plan to repeat procalcitonin a.m.  If  procalcitonin continues to be negative will plan to deescalate antibiotic therapy  Likely able to discharge tomorrow with home health with PT OT     Oropharyngeal dysphagia  9/17:  Speech therapy on case  Diet started      Elevated troponin  -Possibly due to renal failure.   -Trend troponin.   -ASA daily.       Hyponatremia  -Due to alcoholism and hypovolemia.   -Continue hydration and monitor.       Alcohol abuse  -Patient counseled on cessation.   -Ativan for DT symptoms and Librium for DT prophylaxis.   -MVI, thiamine and folic acid.         LOPEZ (acute kidney injury)  -Likely due to hypoperfusion associated with shock.   -Continue IV fluids and monitor.     9/17:  Creatinine trended down to 0.80  Continue monitor      VTE Risk Mitigation (From admission, onward)         Ordered     heparin (porcine) injection 5,000 Units  Every 8 hours      09/16/20 0303     IP VTE HIGH RISK PATIENT  Once      09/16/20 0303                Discharge Planning   KARAN:      Code Status: Full Code   Is the patient medically ready for discharge?:     Reason for patient still in hospital (select all that apply): Patient trending condition  Discharge Plan A: Home, Home with family, Home Health   Discharge Delays: (!) Other              Jorge Larsen MD  Department of Hospital Medicine   [facility]

## 2020-09-17 NOTE — SUBJECTIVE & OBJECTIVE
Interval History:   Seen and examined at bedside. Hospital chart reviewed. No acute interval detrimental events noted. Remains off PRESSORS. Stable Hemodynamics. Spontaneous unassisted ventilation.  He reports that he  has moderately improved. Awaiting MBBS.     Review of Systems   Constitutional: Positive for activity change and fatigue. Negative for chills and fever.   HENT: Positive for congestion. Negative for sore throat.    Eyes:        Left eye blindness   Respiratory: Positive for cough and shortness of breath.    Cardiovascular: Negative for chest pain, palpitations and leg swelling.   Gastrointestinal: Negative for abdominal pain, diarrhea, nausea and vomiting.   Genitourinary: Negative for difficulty urinating.   Musculoskeletal: Negative for back pain and neck stiffness.   Skin: Negative for wound.   Neurological: Negative for tremors, speech difficulty and headaches.   Psychiatric/Behavioral: The patient is not nervous/anxious.        Scheduled Meds:   aspirin  81 mg Oral Daily    ceFEPime (MAXIPIME) IVPB  2 g Intravenous Q24H    chlordiazepoxide  10 mg Oral Q6H    famotidine  20 mg Oral Daily    folic acid  1 mg Oral Daily    heparin (porcine)  5,000 Units Subcutaneous Q8H    metronidazole  500 mg Intravenous Q8H    multivitamin  1 tablet Oral Daily    mupirocin   Nasal BID    nicotine  1 patch Transdermal Daily    thiamine  100 mg Oral Daily     Continuous Infusions:   sodium chloride 0.9% 100 mL/hr at 09/17/20 0500    norepinephrine bitartrate-D5W Stopped (09/16/20 2300)     PRN Meds:.acetaminophen, albuterol-ipratropium, HYDROcodone-acetaminophen, lorazepam, ondansetron, sodium chloride 0.9%, Pharmacy to dose Vancomycin consult **AND** vancomycin - pharmacy to dose    Objective:     Vital Signs (Most Recent):  Temp: 97.1 °F (36.2 °C) (09/17/20 0300)  Pulse: (!) 53 (09/17/20 0530)  Resp: 14 (09/17/20 0530)  BP: (!) 121/57 (09/17/20 0530)  SpO2: 100 % (09/17/20 0530) Vital Signs  (24h Range):  Temp:  [96.5 °F (35.8 °C)-98 °F (36.7 °C)] 97.1 °F (36.2 °C)  Pulse:  [51-76] 53  Resp:  [14-24] 14  SpO2:  [78 %-100 %] 100 %  BP: ()/(34-87) 121/57     Weight: 50 kg (110 lb 3.7 oz)  Body mass index is 14.95 kg/m².      Intake/Output Summary (Last 24 hours) at 9/17/2020 0651  Last data filed at 9/17/2020 0500  Gross per 24 hour   Intake 3435 ml   Output 700 ml   Net 2735 ml       Physical Exam    Vents:       Lines/Drains/Airways     Central Venous Catheter Line            Percutaneous Central Line Insertion/Assessment - Triple Lumen  09/15/20 1756 right internal jugular 1 day          Peripheral Intravenous Line                 Peripheral IV - Single Lumen 09/15/20 1555 20 G Left Antecubital 1 day         Peripheral IV - Single Lumen 09/15/20 1555 20 G Right Antecubital 1 day                Significant Labs:    CBC/Anemia Profile:  Recent Labs   Lab 09/15/20  1545 09/16/20  0715 09/17/20  0500   WBC 6.38 5.83 5.92   HGB 9.7* 9.7* 9.4*   HCT 28.4* 27.9* 27.7*    168 157   * 100* 100*   RDW 15.1* 14.8* 15.0*        Chemistries:  Recent Labs   Lab 09/15/20  1545 09/16/20  0715 09/17/20  0500   * 134* 136   K 4.1 4.1 4.2    112* 115*   CO2 15* 16* 16*   BUN 61* 38* 25*   CREATININE 2.7* 1.6* 0.8   CALCIUM 8.3* 7.1* 7.5*   ALBUMIN 2.6* 2.1*  --    PROT 7.5 6.2  --    BILITOT 0.1 0.2  --    ALKPHOS 68 55  --    ALT 7* <5*  --    AST 16 10  --    MG 2.6  --  1.8   PHOS 3.6  --  2.6*       A1C: No results for input(s): HGBA1C in the last 48 hours.  ABGs: No results for input(s): PH, PCO2, HCO3, POCSATURATED, BE in the last 48 hours.  Bilirubin:   Recent Labs   Lab 09/15/20  1545 09/16/20  0715   BILITOT 0.1 0.2     Coagulation:   Recent Labs   Lab 09/15/20  1545   INR 0.9     Lactic Acid:   Recent Labs   Lab 09/15/20  1545 09/15/20  1935   LACTATE 3.5* 1.4     Troponin:   Recent Labs   Lab 09/15/20  1545 09/16/20  0340 09/16/20  1046   TROPONINI 0.062* 0.018 0.012     Urine  Studies:   Recent Labs   Lab 09/15/20  1720   COLORU Yellow   APPEARANCEUA Clear   PHUR 6.0   SPECGRAV <=1.005*   PROTEINUA Negative   GLUCUA Negative   KETONESU Negative   BILIRUBINUA Negative   OCCULTUA Negative   NITRITE Negative   UROBILINOGEN Negative   LEUKOCYTESUR 2+*   RBCUA 2   WBCUA 50*   BACTERIA Rare   SQUAMEPITHEL 5     All pertinent labs within the past 24 hours have been reviewed.    Significant Imaging:    CT CHEST WITHOUT CONTRAST: 09/16/20:  Base of Neck: Right-sided central line tip overlies the SVC.  Small amount of subcutaneous gas thought to reflect instrumentation.     Airways: Patent.     Lungs: Severe emphysematous changes throughout the lungs greatest within the upper lobes, right greater than left.  Nodular infiltrates are seen within the right upper lobe concerning for airspace disease/pneumonia.  Follow-up is recommended to exclude underlying neoplastic process.  Marking Actos is noted.     Pleura: Question trace bilateral pleural fluid.No pleural calcification.     Rosio/Mediastinum: No pathologic cisco enlargement.     Esophagus: Normal.     Heart/pericardium: Normal size.  Trace pericardial effusion.  No calcifications.     Pulmonary vasculature: Pulmonary arteries distribute normally.  There are four pulmonary veins.     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  The aorta maintains normal caliber, contour and course. There is moderate calcification of the thoracic aorta.  There is  moderate coronary artery calcification.     Thoracic soft tissues: Normal. Both breasts are present.     Bones: No acute fracture.  Minimal degenerative change.  No suspicious lytic or sclerotic lesion.     Upper Abdomen: No abnormality of the partially imaged upper abdomen.  Clip noted at the GE junction.  Bowel anastomotic sutures seen within the distal stomach.

## 2020-09-17 NOTE — PLAN OF CARE
Patient cooperative and motivated this date. Completed sit>stand with CGA. With min hand held assistance patient walked to w/c to prep for transfer to tele. Patient completed stand>pivot transfer with min hand held assistance. Remains appropriate for HHOT with 24/7 SPV of son.

## 2020-09-17 NOTE — PROGRESS NOTES
Pt transferred from ICU to room 218. Blood pressure (!) 170/85, pulse 68, temperature 96.6 °F (35.9 °C), temperature source Rectal, resp. rate 18, height 6' (1.829 m), weight 50 kg (110 lb 3.7 oz), SpO2 99 %.  RACHAEL

## 2020-09-17 NOTE — PLAN OF CARE
Problem: Fall Injury Risk  Goal: Absence of Fall and Fall-Related Injury  Outcome: Ongoing, Progressing   IV ABX administered per order  Bairhugger applied to pt  Ambulates independently   Safety precautions in place and maintained  Room air  NADN

## 2020-09-17 NOTE — ASSESSMENT & PLAN NOTE
-IV antibiotics, inhaled medications and oxygen therapy as needed.   -Check sputum culture.   -Follow up blood cultures.   -ST evaluation.     9/17:  Currently on cefepime and Flagyl  Initial procalcitonin negative  Will plan to repeat procalcitonin a.m.  If procalcitonin continues to be negative will plan to deescalate antibiotic therapy  Likely able to discharge tomorrow with home health with PT OT

## 2020-09-17 NOTE — HOSPITAL COURSE
9/17:  Patient off of Levophed.  Stepped down from ICU today.  Discontinue vancomycin.  Continue cefepime and Flagyl to cover aspiration pneumonia.  Patient looks poorly nourished.  States that he will go live with his son whenever discharged from the hospital.  9/18 Pt  Denies nay complaint for todaY . He is ready for d/c . He had a NAGMA most likely to NS infusion . The NAGMA improved after Ns was d/c   9/19 Pt was seen and examined at bedside . He was determined to be suitable for d/c  He will be d/c home with HH   NAGMA  Due to NS infusion.

## 2020-09-17 NOTE — SUBJECTIVE & OBJECTIVE
Interval History:  No acute events reported overnight.  Patient weaned off of Levophed.  Stepped down to floor.     Review of Systems   Constitutional: Negative for fatigue and fever.   HENT: Negative for sinus pressure.    Eyes: Negative for visual disturbance.   Respiratory: Negative for shortness of breath.    Cardiovascular: Negative for chest pain.   Gastrointestinal: Negative for nausea and vomiting.   Genitourinary: Negative for difficulty urinating.   Musculoskeletal: Negative for back pain.   Skin: Negative for rash.   Neurological: Positive for weakness. Negative for headaches.   Psychiatric/Behavioral: Negative for confusion.     Objective:     Vital Signs (Most Recent):  Temp: 96.6 °F (35.9 °C)(blankets and heat adjusted) (09/17/20 1413)  Pulse: 70 (09/17/20 1647)  Resp: 18 (09/17/20 1647)  BP: (!) 175/85 (09/17/20 1647)  SpO2: 99 % (09/17/20 1413) Vital Signs (24h Range):  Temp:  [96.6 °F (35.9 °C)-98 °F (36.7 °C)] 96.6 °F (35.9 °C)  Pulse:  [50-83] 70  Resp:  [14-22] 18  SpO2:  [78 %-100 %] 99 %  BP: ()/(34-85) 175/85     Weight: 50 kg (110 lb 3.7 oz)  Body mass index is 14.95 kg/m².    Intake/Output Summary (Last 24 hours) at 9/17/2020 1726  Last data filed at 9/17/2020 0705  Gross per 24 hour   Intake 1470.75 ml   Output 775 ml   Net 695.75 ml      Physical Exam  Constitutional:       General: He is not in acute distress.     Appearance: He is well-developed. He is ill-appearing. He is not diaphoretic.      Comments: Cachectic   HENT:      Head: Normocephalic and atraumatic.   Eyes:      Pupils: Pupils are equal, round, and reactive to light.   Cardiovascular:      Rate and Rhythm: Normal rate and regular rhythm.      Heart sounds: Normal heart sounds. No murmur. No friction rub. No gallop.    Pulmonary:      Effort: Pulmonary effort is normal. No respiratory distress.      Breath sounds: Normal breath sounds. No stridor. No wheezing or rales.   Abdominal:      General: Bowel sounds are normal.  There is no distension.      Palpations: Abdomen is soft. There is no mass.      Tenderness: There is no abdominal tenderness. There is no guarding.   Musculoskeletal:      Right lower leg: No edema.      Left lower leg: No edema.   Skin:     General: Skin is warm.      Findings: No erythema.   Neurological:      Mental Status: He is alert and oriented to person, place, and time.         Significant Labs:   Recent Lab Results       09/17/20  0500   09/16/20  1810        Acanthocytes Present       Anion Gap 5       Aniso Slight       Baso # 0.01       Basophil% 0.2       BUN, Bld 25       Calcium 7.5       Chloride 115       CO2 16       Creatinine 0.8       Differential Method Automated       eGFR if  >60       eGFR if non  >60  Comment:  Calculation used to obtain the estimated glomerular filtration  rate (eGFR) is the CKD-EPI equation.          Eos # 0.1       Eosinophil% 1.9       Glucose 75       Gran # (ANC) 4.0       Gran% 67.1       Hematocrit 27.7       Hemoglobin 9.4       Immature Grans (Abs) 0.02  Comment:  Mild elevation in immature granulocytes is non specific and   can be seen in a variety of conditions including stress response,   acute inflammation, trauma and pregnancy. Correlation with other   laboratory and clinical findings is essential.         Immature Granulocytes 0.3       Lymph # 1.2       Lymph% 19.4       Magnesium 1.8       MCH 33.8       MCHC 33.9              Mono # 0.7       Mono% 11.1       MPV 11.3       nRBC 0       Ovalocytes Occasional       Phosphorus 2.6       Platelet Estimate Appears normal       Platelets 157       Poik Slight       Potassium 4.2       RBC 2.78       RDW 15.0       Sodium 136       Tear Drop Cells Occasional       Vancomycin, Random   6.2     WBC 5.92           All pertinent labs within the past 24 hours have been reviewed.    Significant Imaging: I have reviewed all pertinent imaging results/findings within the past 24  hours.

## 2020-09-17 NOTE — PROGRESS NOTES
Pharmacokinetic Assessment Follow Up: IV Vancomycin    Vancomycin serum concentration assessment(s):    The random level was drawn correctly and can be used to guide therapy at this time. The measurement is below the desired definitive target range of 15 to 20 mcg/mL.    Vancomycin Regimen Plan:    Re-dose when the random level is less than 20 mcg/mL, next level to be drawn at 1900 on 9/17    Drug levels (last 3 results):  Recent Labs   Lab Result Units 09/16/20  1810   Vancomycin, Random ug/mL 6.2       Pharmacy will continue to follow and monitor vancomycin.    Please contact pharmacy at extension [phone] for questions regarding this assessment.    Thank you for the consult,   Rosmery Harmon       Patient brief summary:  Ty López is a 75 y.o. male initiated on antimicrobial therapy with IV Vancomycin for treatment of sepsis    The patient's current regimen is pulse dosing    Drug Allergies:   Review of patient's allergies indicates:  No Known Allergies    Actual Body Weight:   50kg    Renal Function:   Estimated Creatinine Clearance: 29.1 mL/min (A) (based on SCr of 1.6 mg/dL (H)).,     Dialysis Method (if applicable):  N/A    CBC (last 72 hours):  Recent Labs   Lab Result Units 09/15/20  1545 09/16/20  0715   WBC K/uL 6.38 5.83   Hemoglobin g/dL 9.7* 9.7*   Hematocrit % 28.4* 27.9*   Platelets K/uL 185 168   Gran% % 57.3 65.4   Lymph% % 27.6 20.6   Mono% % 12.7 12.2   Eosinophil% % 1.4 1.4   Basophil% % 0.5 0.2   Differential Method  Automated Automated       Metabolic Panel (last 72 hours):  Recent Labs   Lab Result Units 09/15/20  1545 09/15/20  1720 09/16/20  0715   Sodium mmol/L 131*  --  134*   Potassium mmol/L 4.1  --  4.1   Chloride mmol/L 103  --  112*   CO2 mmol/L 15*  --  16*   Glucose mg/dL 92  --  78   Glucose, UA   --  Negative  --    BUN, Bld mg/dL 61*  --  38*   Creatinine mg/dL 2.7*  --  1.6*   Creatinine, Random Ur mg/dL  --  43.7  --    Albumin g/dL 2.6*  --  2.1*   Total Bilirubin  mg/dL 0.1  --  0.2   Alkaline Phosphatase U/L 68  --  55   AST U/L 16  --  10   ALT U/L 7*  --  <5*   Magnesium mg/dL 2.6  --   --    Phosphorus mg/dL 3.6  --   --        Vancomycin Administrations:  vancomycin given in the last 96 hours                   vancomycin in dextrose 5 % 1 gram/250 mL IVPB 1,000 mg (mg) 1,000 mg New Bag 09/15/20 0396                Microbiologic Results:  Microbiology Results (last 7 days)     Procedure Component Value Units Date/Time    Culture, Respiratory with Gram Stain [[id number]]     Order Status: No result Specimen: Respiratory     AFB Culture & Smear [[id number]]     Order Status: No result Specimen: Respiratory     Blood culture x two cultures. Draw prior to antibiotics. [[id number]] Collected: 09/15/20 1545    Order Status: Sent Specimen: Blood from Peripheral, Antecubital, Left Updated: 09/16/20 0137    Blood culture x two cultures. Draw prior to antibiotics. [[id number]] Collected: 09/15/20 1557    Order Status: Sent Specimen: Blood from Peripheral, Antecubital, Right Updated: 09/16/20 0137    Culture, Respiratory with Gram Stain [[id number]]     Order Status: No result Specimen: Respiratory from Sputum, Expectorated     Urine culture [[id number]] Collected: 09/15/20 1720    Order Status: No result Specimen: Urine Updated: 09/15/20 1740    Influenza A & B by Molecular [[id number]] Collected: 09/15/20 1555    Order Status: Completed Specimen: Nasopharyngeal Swab Updated: 09/15/20 1644     Influenza A, Molecular Negative     Influenza B, Molecular Negative     Flu A & B Source NP

## 2020-09-17 NOTE — PROGRESS NOTES
[facility] -   Pulmonology  Progress Note    Patient Name: Ty López  MRN: [id number]  Admission Date: 9/15/2020  Hospital Length of Stay: 2 days  Code Status: Full Code  Attending Provider: Jorge Larsen MD  Primary Care Provider: ADRIAN Zheng   Principal Problem: Septic shock    Subjective:           Interval History:   Seen and examined at bedside. Hospital chart reviewed. No acute interval detrimental events noted. Remains off PRESSORS. Stable Hemodynamics. Spontaneous unassisted ventilation.  He reports that he  has moderately improved. Awaiting MBBS.     Review of Systems   Constitutional: Positive for activity change and fatigue. Negative for chills and fever.   HENT: Positive for congestion. Negative for sore throat.    Eyes:        Left eye blindness   Respiratory: Positive for cough and shortness of breath.    Cardiovascular: Negative for chest pain, palpitations and leg swelling.   Gastrointestinal: Negative for abdominal pain, diarrhea, nausea and vomiting.   Genitourinary: Negative for difficulty urinating.   Musculoskeletal: Negative for back pain and neck stiffness.   Skin: Negative for wound.   Neurological: Negative for tremors, speech difficulty and headaches.   Psychiatric/Behavioral: The patient is not nervous/anxious.        Scheduled Meds:   aspirin  81 mg Oral Daily    ceFEPime (MAXIPIME) IVPB  2 g Intravenous Q24H    chlordiazepoxide  10 mg Oral Q6H    famotidine  20 mg Oral Daily    folic acid  1 mg Oral Daily    heparin (porcine)  5,000 Units Subcutaneous Q8H    metronidazole  500 mg Intravenous Q8H    multivitamin  1 tablet Oral Daily    mupirocin   Nasal BID    nicotine  1 patch Transdermal Daily    thiamine  100 mg Oral Daily     Continuous Infusions:   sodium chloride 0.9% 100 mL/hr at 09/17/20 0500    norepinephrine bitartrate-D5W Stopped (09/16/20 2300)     PRN Meds:.acetaminophen, albuterol-ipratropium, HYDROcodone-acetaminophen, lorazepam, ondansetron,  sodium chloride 0.9%, Pharmacy to dose Vancomycin consult **AND** vancomycin - pharmacy to dose    Objective:     Vital Signs (Most Recent):  Temp: 97.1 °F (36.2 °C) (09/17/20 0300)  Pulse: (!) 53 (09/17/20 0530)  Resp: 14 (09/17/20 0530)  BP: (!) 121/57 (09/17/20 0530)  SpO2: 100 % (09/17/20 0530) Vital Signs (24h Range):  Temp:  [96.5 °F (35.8 °C)-98 °F (36.7 °C)] 97.1 °F (36.2 °C)  Pulse:  [51-76] 53  Resp:  [14-24] 14  SpO2:  [78 %-100 %] 100 %  BP: ()/(34-87) 121/57     Weight: 50 kg (110 lb 3.7 oz)  Body mass index is 14.95 kg/m².      Intake/Output Summary (Last 24 hours) at 9/17/2020 0651  Last data filed at 9/17/2020 0500  Gross per 24 hour   Intake 3435 ml   Output 700 ml   Net 2735 ml       Physical Exam    Vents:       Lines/Drains/Airways     Central Venous Catheter Line            Percutaneous Central Line Insertion/Assessment - Triple Lumen  09/15/20 1756 right internal jugular 1 day          Peripheral Intravenous Line                 Peripheral IV - Single Lumen 09/15/20 1555 20 G Left Antecubital 1 day         Peripheral IV - Single Lumen 09/15/20 1555 20 G Right Antecubital 1 day                Significant Labs:    CBC/Anemia Profile:  Recent Labs   Lab 09/15/20  1545 09/16/20  0715 09/17/20  0500   WBC 6.38 5.83 5.92   HGB 9.7* 9.7* 9.4*   HCT 28.4* 27.9* 27.7*    168 157   * 100* 100*   RDW 15.1* 14.8* 15.0*        Chemistries:  Recent Labs   Lab 09/15/20  1545 09/16/20  0715 09/17/20  0500   * 134* 136   K 4.1 4.1 4.2    112* 115*   CO2 15* 16* 16*   BUN 61* 38* 25*   CREATININE 2.7* 1.6* 0.8   CALCIUM 8.3* 7.1* 7.5*   ALBUMIN 2.6* 2.1*  --    PROT 7.5 6.2  --    BILITOT 0.1 0.2  --    ALKPHOS 68 55  --    ALT 7* <5*  --    AST 16 10  --    MG 2.6  --  1.8   PHOS 3.6  --  2.6*       A1C: No results for input(s): HGBA1C in the last 48 hours.  ABGs: No results for input(s): PH, PCO2, HCO3, POCSATURATED, BE in the last 48 hours.  Bilirubin:   Recent Labs   Lab  09/15/20  1545 09/16/20  0715   BILITOT 0.1 0.2     Coagulation:   Recent Labs   Lab 09/15/20  1545   INR 0.9     Lactic Acid:   Recent Labs   Lab 09/15/20  1545 09/15/20  1935   LACTATE 3.5* 1.4     Troponin:   Recent Labs   Lab 09/15/20  1545 09/16/20  0340 09/16/20  1046   TROPONINI 0.062* 0.018 0.012     Urine Studies:   Recent Labs   Lab 09/15/20  1720   COLORU Yellow   APPEARANCEUA Clear   PHUR 6.0   SPECGRAV <=1.005*   PROTEINUA Negative   GLUCUA Negative   KETONESU Negative   BILIRUBINUA Negative   OCCULTUA Negative   NITRITE Negative   UROBILINOGEN Negative   LEUKOCYTESUR 2+*   RBCUA 2   WBCUA 50*   BACTERIA Rare   SQUAMEPITHEL 5     All pertinent labs within the past 24 hours have been reviewed.    Significant Imaging:    CT CHEST WITHOUT CONTRAST: 09/16/20:  Base of Neck: Right-sided central line tip overlies the SVC.  Small amount of subcutaneous gas thought to reflect instrumentation.     Airways: Patent.     Lungs: Severe emphysematous changes throughout the lungs greatest within the upper lobes, right greater than left.  Nodular infiltrates are seen within the right upper lobe concerning for airspace disease/pneumonia.  Follow-up is recommended to exclude underlying neoplastic process.  Marking Actos is noted.     Pleura: Question trace bilateral pleural fluid.No pleural calcification.     Rosio/Mediastinum: No pathologic cisco enlargement.     Esophagus: Normal.     Heart/pericardium: Normal size.  Trace pericardial effusion.  No calcifications.     Pulmonary vasculature: Pulmonary arteries distribute normally.  There are four pulmonary veins.     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  The aorta maintains normal caliber, contour and course. There is moderate calcification of the thoracic aorta.  There is  moderate coronary artery calcification.     Thoracic soft tissues: Normal. Both breasts are present.     Bones: No acute fracture.  Minimal degenerative change.  No suspicious lytic  or sclerotic lesion.     Upper Abdomen: No abnormality of the partially imaged upper abdomen.  Clip noted at the GE junction.  Bowel anastomotic sutures seen within the distal stomach.        ABG  No results for input(s): PH, PO2, PCO2, HCO3, BE in the last 168 hours.  Assessment/Plan:     Acute cystitis without hematuria  Empiric antibiotics: CEFEPIME  And FLAGYL  Urine culture pending    Oropharyngeal dysphagia  Diet per ST recs  MBSS ordered per recs  Awaiting MBBS    Severe malnutrition  Encourage po intake and ETOH cessation    Hyponatremia  IMPROVING  Continue IV hydration  Encourage ETOH cessation and optimization of nutrition    Alcohol abuse  Expresses recent decrease and active desire to quit  Librium for DT prophylaxis  Monitor CIWA AR scale  Offer outpatient cessation support resources prior to discharge    LOPEZ (acute kidney injury)  RESOLVED WITH VOLUME RESUSCITATION AND HEMODYNAMIC OPTIMIZATION    Monitor creatinine  Avoid nephrotoxins    Pneumonia of right middle lobe due to infectious organism  Suspicious for aspiration  Empiric abx: CEFEPIME and METRONIDAZOLE  Encourage TCDB and mobilization  Supplemental oxygen prn to keep SAT > 92      VTE Risk Mitigation (From admission, onward)         Ordered     heparin (porcine) injection 5,000 Units  Every 8 hours      09/16/20 0303     IP VTE HIGH RISK PATIENT  Once      09/16/20 0303              Continue current ongoing medical management. OK to transfer patient out of the ICU. Will sign off upon ICU transfer. Please re consult if further pulmonary issues arise.       Rohit Stephen MD  Pulmonology  [facility] -

## 2020-09-17 NOTE — PLAN OF CARE
Patient is alert and oriented. 100% on room air. Sinus bradycardia, HR 55-60. Levophed was titrated off at 2200 last night, SBP 110s-120s with MAP 80s. Patient had  2 liquid bowel movements overnight. POC reviewed with patient. Bed is locked in lowest position, bed alarm set, side rails up x2, call light and personal items within reach.

## 2020-09-17 NOTE — PT/OT/SLP PROGRESS
Physical Therapy  Treatment    Ty López   MRN: [id number]   Admitting Diagnosis: Pneumonia of right middle lobe due to infectious organism    PT Received On: 09/17/20  PT Start Time: 0330     PT Stop Time: 0345    PT Total Time (min): 15 min       Billable Minutes:  Therapeutic Exercise 15    Treatment Type: Treatment  PT/PTA: PTA     PTA Visit Number: 1       General Precautions: Standard, fall  Orthopedic Precautions: N/A   Braces: N/A         Subjective:  Communicated with nurse and Epic chart review completed prior to session.           Objective:   Patient found with: peripheral IV, telemetry    Functional Mobility:  Bed Mobility: Supine <-> sit: SBA       Transfers: STS from EOB to RW: SBA       Gait: 40 ft with RW CGA-Min A with intermittent cues for safe RW mgmt       Balance:   Static Sit: FAIR+: Able to take MINIMAL challenges from all directions  Dynamic Sit: FAIR+: Maintains balance through MINIMAL excursions of active trunk motion  Static Stand: FAIR+: Takes MINIMAL challenges from all directions  Dynamic stand: POOR+: Needs MIN (minimal ) assist during gait       Therapeutic Activities and Exercises:  Standing calf raises at RW 3x10     AM-PAC 6 CLICK MOBILITY  How much help from another person does this patient currently need?   1 = Unable, Total/Dependent Assistance  2 = A lot, Maximum/Moderate Assistance  3 = A little, Minimum/Contact Guard/Supervision  4 = None, Modified Central/Independent    Turning over in bed (including adjusting bedclothes, sheets and blankets)?: 4  Sitting down on and standing up from a chair with arms (e.g., wheelchair, bedside commode, etc.): 3  Moving from lying on back to sitting on the side of the bed?: 4  Moving to and from a bed to a chair (including a wheelchair)?: 3  Need to walk in hospital room?: 3  Climbing 3-5 steps with a railing?: 1  Basic Mobility Total Score: 18    AM-PAC Raw Score CMS G-Code Modifier Level of Impairment Assistance   6 %  Total / Unable   7 - 9 CM 80 - 100% Maximal Assist   10 - 14 CL 60 - 80% Moderate Assist   15 - 19 CK 40 - 60% Moderate Assist   20 - 22 CJ 20 - 40% Minimal Assist   23 CI 1-20% SBA / CGA   24 CH 0% Independent/ Mod I     Patient left in be with HOB elevated with call button in reach and bed alarm on.    Assessment:  Ty López is a 75 y.o. male with a medical diagnosis of Pneumonia of right middle lobe due to infectious organism and presents with overall decreased functional mobility. Skilled PT continues to be necessary to improve deficits listed below and return to PLOF.    Rehab identified problem list/impairments: Rehab identified problem list/impairments: weakness, impaired endurance, impaired self care skills, impaired functional mobilty, gait instability, impaired balance, visual deficits, impaired cognition, decreased lower extremity function, decreased safety awareness, decreased ROM, impaired coordination    Rehab potential is good.    Activity tolerance: Good    Discharge recommendations: Discharge Facility/Level of Care Needs: home with home health     Barriers to discharge:      Equipment recommendations: Equipment Needed After Discharge: bedside commode, bath bench, walker, rolling     GOALS:   Multidisciplinary Problems     Physical Therapy Goals        Problem: Physical Therapy Goal    Goal Priority Disciplines Outcome Goal Variances Interventions   Physical Therapy Goal     PT, PT/OT      Description: LTG'S TO BE MET IN 7 DAYS (9-23-20)  1. PT WILL REQUIRE SPV FOR BED MOBILITY  2. PT WILL REQUIRE SBA FOR BED MOBILITY  3. PT WILL ' WITH RW AND SBA                   PLAN:    Patient to be seen 5 x/week  to address the above listed problems via gait training, therapeutic activities, therapeutic exercises  Plan of Care expires: 09/23/20  Plan of Care reviewed with: patient         Kajal Jewell, PTA  09/17/2020

## 2020-09-17 NOTE — PROCEDURES
Modified Barium Swallow    Patient Name:  Ty López   MRN:  [id number]      Recommendations:     Recommendations:                General Recommendations:  Dysphagia therapy  Diet recommendations:  Mechanical soft, Thin   Aspiration Precautions: 1 bite/sip at a time, Assistance with meals, Double swallow with each bite/sip, Eliminate distractions, HOB to 90 degrees, Remain upright 30 minutes post meal and Small bites/sips   General Precautions: Standard, aspiration, fall, respiratory    Referral     Reason for Referral  Patient was referred for a Modified Barium Swallow Study to assess the efficiency of his/her swallow function, rule out aspiration and make recommendations regarding safe dietary consistencies, effective compensatory strategies, and safe eating environment.     Diagnosis: Septic shock   The primary encounter diagnosis was SOB (shortness of breath). Diagnoses of Fatigue, Pneumonia of right middle lobe due to infectious organism, Elevated lactic acid level, Anemia, unspecified type, LOPEZ (acute kidney injury), Encounter for central line placement, and Septic shock were also pertinent to this visit.    History:     Past Medical History:   Diagnosis Date    Blind left eye        Objective:     Current Respiratory Status: Pt tolerating a nasal cannula for O2 support     Alert: yes    Cooperative: yes    Follows Directions: yes    Visualization  · Patient was seen in the lateral view    Oral Peripheral Examination  · Oral Musculature: general weakness  · Dentition: scattered dentition    Consistencies Assessed  · Thin liquids, puree, and solids with barium     Oral Preparation/Oral Phase  · Oral residue present post swallow   · Decreased mastication secondary to dentition    Pharyngeal Phase   · Decreased laryngeal elevation resulting in pharyngeal residue which required pt to produce 2-3 swallows to clear  · Adequate vestibule closure and coordination with no penetration or aspiration visualized      Cervical Esophageal Phase  · A small cricopharyngeal bar present, however it did not impede the bolus flow     Assessment:     Impressions  ·  Pt presents with oropharyngeal dysphagia characterized by overall weakness and decreased muscle tone.  No aspiration was visualized on MBSS, however pt proves to be at a high risk when considering the amount of pharyngeal residue, degree of malnutrition and decreased muscle tone, and the amount of alcohol the patient drinks.  ST recommends to continue diet of soft mechanical with thin liquids and a nutritional supplement with every meal.  Pt will benefit from ongoing ST for dysphagia therapy.    Goals:   Multidisciplinary Problems     SLP Goals        Problem: SLP Goal    Goal Priority Disciplines Outcome   SLP Goal     SLP    Description: 1. Pt will complete oral and pharyngeal ex's with min A for increased strength and ROM  2. Pt will tolerate least restrictive diet while maintaining airway integrity                    Plan:   · Patient to be seen:  Therapy Frequency: 2 x/week   · Plan of Care expires:  09/23/20  · Plan of Care reviewed with:  patient          Time Tracking:   SLP Treatment Date:   09/17/20  Speech Start Time:  1000  Speech Stop Time:  1030     Speech Total Time (min):  30 min    Amy García CCC-SLP  09/17/2020

## 2020-09-17 NOTE — ASSESSMENT & PLAN NOTE
RESOLVED WITH VOLUME RESUSCITATION AND HEMODYNAMIC OPTIMIZATION    Monitor creatinine  Avoid nephrotoxins

## 2020-09-17 NOTE — PROGRESS NOTES
Pharmacist Renal Dose Adjustment Note    Ty López is a 75 y.o. male being treated with the medication Pepcid.     Patient Data:    Vital Signs (Most Recent):  Temp: 97.4 °F (36.3 °C) (09/17/20 0705)  Pulse: 66 (09/17/20 0905)  Resp: 15 (09/17/20 0905)  BP: (!) 143/72 (09/17/20 0905)  SpO2: 100 % (09/17/20 0905)   Vital Signs (72h Range):  Temp:  [96.5 °F (35.8 °C)-98 °F (36.7 °C)]   Pulse:  [49-78]   Resp:  [13-35]   BP: ()/(34-89)   SpO2:  [78 %-100 %]      Recent Labs   Lab 09/15/20  1545 09/16/20  0715 09/17/20  0500   CREATININE 2.7* 1.6* 0.8     Serum creatinine: 0.8 mg/dL 09/17/20 0500  Estimated creatinine clearance: 58.2 mL/min    Pepcid 20 mg PO daily has been changed to 20 mg PO BID based on the renal dose adjustment protocol.    Pharmacist's Name: Earle Gaona, PharmANA 9/17/2020 11:20 AM    Pharmacist's Extension: [phone]

## 2020-09-17 NOTE — PROGRESS NOTES
Pharmacist Renal Dose Adjustment Note    Ty López is a 75 y.o. male being treated with the medication cefepime.     Patient Data:    Vital Signs (Most Recent):  Temp: 97.4 °F (36.3 °C) (09/17/20 0705)  Pulse: 66 (09/17/20 0905)  Resp: 15 (09/17/20 0905)  BP: (!) 143/72 (09/17/20 0905)  SpO2: 100 % (09/17/20 0905)   Vital Signs (72h Range):  Temp:  [96.5 °F (35.8 °C)-98 °F (36.7 °C)]   Pulse:  [49-78]   Resp:  [13-35]   BP: ()/(34-89)   SpO2:  [78 %-100 %]      Recent Labs   Lab 09/15/20  1545 09/16/20  0715 09/17/20  0500   CREATININE 2.7* 1.6* 0.8     Serum creatinine: 0.8 mg/dL 09/17/20 0500  Estimated creatinine clearance: 58.2 mL/min    Cefepime 2 g IV q24 h has been changed to 2 g IV q12 h based on the renal dose adjustment protocol.    Pharmacist's Name: Earle Gaona, PharmANA 9/17/2020 11:21 AM    Pharmacist's Extension: [phone]

## 2020-09-17 NOTE — NURSING TRANSFER
Nursing Transfer Note      9/17/2020     Transfer to Telemetry room 218 from ICU 10    Transfer via wheelchair    Transfer with tele monitor; all personal belongings     Transported by SHARON William    Medicines sent: mupirocin    Chart send with patient: YES     Notified: Telephone report given to [email or web address]    Patient reassessed at: 1330    Upon arrival to floor: pt A&Ox4, no c/o pain. Able to ambulate with PT to wheelchair from chair in room.

## 2020-09-17 NOTE — PROGRESS NOTES
Pharmacy Consult Sign Off    Therapy with vancomycin is complete and/or consult has been discontinued by the provider.   Pharmacy will sign off. Please re-consult as needed.     Thank you for allowing us to participate in this patient's care.     Earle Gaona PharmD 9/17/2020 7:59 AM

## 2020-09-17 NOTE — ASSESSMENT & PLAN NOTE
-Likely due to hypoperfusion associated with shock.   -Continue IV fluids and monitor.     9/17:  Creatinine trended down to 0.80  Continue monitor

## 2020-09-17 NOTE — PT/OT/SLP PROGRESS
Occupational Therapy   Treatment    Name: Ty López  MRN: [id number]  Admitting Diagnosis:  Septic shock       Recommendations:     Discharge Recommendations: home health OT(with 24/7 SPV)  Discharge Equipment Recommendations:  bedside commode, bath bench, walker, rolling  Barriers to discharge:  None    Assessment:     Ty López is a 75 y.o. male with a medical diagnosis of Septic shock.  He presents with the following Performance deficits affecting function are weakness, decreased upper extremity function, impaired endurance, impaired balance, impaired self care skills, impaired functional mobilty.     Rehab Prognosis:  Good; patient would benefit from acute skilled OT services to address these deficits and reach maximum level of function.       Plan:     Patient to be seen 3 x/week to address the above listed problems via self-care/home management, therapeutic exercises, therapeutic activities  · Plan of Care Expires: 09/23/20  · Plan of Care Reviewed with: patient    Subjective     Pain/Comfort:  · Pain Rating 1: 0/10    Objective:     Communicated with: Nurse, Karli, prior to session.  Patient found up in chair with telemetry, peripheral IV upon OT entry to room.    General Precautions: Standard, fall   Orthopedic Precautions:N/A   Braces: N/A     Functional Mobility/Transfers:  · Patient completed Sit <> Stand Transfer with contact guard assistance  with  no assistive device   · Patient completed Chair <> Mat Stand Pivot technique with minimum assistance with hand-held assist  · Functional Mobility: Patient completed ~10ft functional mobility to w/c outside of his ICU room with min HHA to prep for transfer to tele floor.    [facility] 6 Click ADL: 17    Treatment & Education:  Patient provided with education on safe transfer techniques.    Patient left seated in w/c with all lines intact and nurse presentEducation:      GOALS:   Multidisciplinary Problems     Occupational Therapy Goals        Problem:  Occupational Therapy Goal    Goal Priority Disciplines Outcome Interventions   Occupational Therapy Goal     OT, PT/OT Ongoing, Progressing                    Time Tracking:     OT Date of Treatment: 09/17/20  OT Start Time: 1310  OT Stop Time: 1320  OT Total Time (min): 10 min    Billable Minutes:Therapeutic Activity 10    Giselle Michel OT  9/17/2020

## 2020-09-17 NOTE — ASSESSMENT & PLAN NOTE
Suspicious for aspiration  Empiric abx: CEFEPIME and METRONIDAZOLE  Encourage TCDB and mobilization  Supplemental oxygen prn to keep SAT > 92

## 2020-09-17 NOTE — NURSING
1318 Remove RIJ TLC with tip and tubing intact. Pressure held to site x5min. Clean site and apply gauze&tape dressing. Pt noa well.

## 2020-09-18 PROBLEM — R06.02 SOB (SHORTNESS OF BREATH): Status: RESOLVED | Noted: 2020-09-17 | Resolved: 2020-09-18

## 2020-09-18 LAB
ACANTHOCYTES BLD QL SMEAR: PRESENT
ANION GAP SERPL CALC-SCNC: 3 MMOL/L (ref 8–16)
ANION GAP SERPL CALC-SCNC: 5 MMOL/L (ref 8–16)
ANISOCYTOSIS BLD QL SMEAR: SLIGHT
BASOPHILS # BLD AUTO: 0.03 K/UL (ref 0–0.2)
BASOPHILS NFR BLD: 0.6 % (ref 0–1.9)
BUN SERPL-MCNC: 13 MG/DL (ref 8–23)
BUN SERPL-MCNC: 14 MG/DL (ref 8–23)
CALCIUM SERPL-MCNC: 7.6 MG/DL (ref 8.7–10.5)
CALCIUM SERPL-MCNC: 7.8 MG/DL (ref 8.7–10.5)
CHLORIDE SERPL-SCNC: 114 MMOL/L (ref 95–110)
CHLORIDE SERPL-SCNC: 117 MMOL/L (ref 95–110)
CO2 SERPL-SCNC: 14 MMOL/L (ref 23–29)
CO2 SERPL-SCNC: 16 MMOL/L (ref 23–29)
CREAT SERPL-MCNC: 0.7 MG/DL (ref 0.5–1.4)
CREAT SERPL-MCNC: 0.7 MG/DL (ref 0.5–1.4)
DACRYOCYTES BLD QL SMEAR: ABNORMAL
DIFFERENTIAL METHOD: ABNORMAL
EOSINOPHIL # BLD AUTO: 0.2 K/UL (ref 0–0.5)
EOSINOPHIL NFR BLD: 3.1 % (ref 0–8)
ERYTHROCYTE [DISTWIDTH] IN BLOOD BY AUTOMATED COUNT: 14.6 % (ref 11.5–14.5)
EST. GFR  (AFRICAN AMERICAN): >60 ML/MIN/1.73 M^2
EST. GFR  (AFRICAN AMERICAN): >60 ML/MIN/1.73 M^2
EST. GFR  (NON AFRICAN AMERICAN): >60 ML/MIN/1.73 M^2
EST. GFR  (NON AFRICAN AMERICAN): >60 ML/MIN/1.73 M^2
GLUCOSE SERPL-MCNC: 70 MG/DL (ref 70–110)
GLUCOSE SERPL-MCNC: 89 MG/DL (ref 70–110)
HCT VFR BLD AUTO: 25 % (ref 40–54)
HGB BLD-MCNC: 8.7 G/DL (ref 14–18)
IMM GRANULOCYTES # BLD AUTO: 0.02 K/UL (ref 0–0.04)
IMM GRANULOCYTES NFR BLD AUTO: 0.4 % (ref 0–0.5)
LYMPHOCYTES # BLD AUTO: 1.2 K/UL (ref 1–4.8)
LYMPHOCYTES NFR BLD: 23.1 % (ref 18–48)
MAGNESIUM SERPL-MCNC: 1.6 MG/DL (ref 1.6–2.6)
MCH RBC QN AUTO: 34.1 PG (ref 27–31)
MCHC RBC AUTO-ENTMCNC: 34.8 G/DL (ref 32–36)
MCV RBC AUTO: 98 FL (ref 82–98)
MONOCYTES # BLD AUTO: 0.6 K/UL (ref 0.3–1)
MONOCYTES NFR BLD: 11 % (ref 4–15)
NEUTROPHILS # BLD AUTO: 3.2 K/UL (ref 1.8–7.7)
NEUTROPHILS NFR BLD: 61.8 % (ref 38–73)
NRBC BLD-RTO: 0 /100 WBC
OVALOCYTES BLD QL SMEAR: ABNORMAL
PHOSPHATE SERPL-MCNC: 2.6 MG/DL (ref 2.7–4.5)
PLATELET # BLD AUTO: 139 K/UL (ref 150–350)
PLATELET BLD QL SMEAR: ABNORMAL
PMV BLD AUTO: 11.2 FL (ref 9.2–12.9)
POIKILOCYTOSIS BLD QL SMEAR: SLIGHT
POTASSIUM SERPL-SCNC: 4.4 MMOL/L (ref 3.5–5.1)
POTASSIUM SERPL-SCNC: 4.5 MMOL/L (ref 3.5–5.1)
PROCALCITONIN SERPL IA-MCNC: 0.07 NG/ML
RBC # BLD AUTO: 2.55 M/UL (ref 4.6–6.2)
SODIUM SERPL-SCNC: 133 MMOL/L (ref 136–145)
SODIUM SERPL-SCNC: 136 MMOL/L (ref 136–145)
WBC # BLD AUTO: 5.16 K/UL (ref 3.9–12.7)

## 2020-09-18 PROCEDURE — 25000003 PHARM REV CODE 250: Performed by: FAMILY MEDICINE

## 2020-09-18 PROCEDURE — 83735 ASSAY OF MAGNESIUM: CPT

## 2020-09-18 PROCEDURE — 84100 ASSAY OF PHOSPHORUS: CPT

## 2020-09-18 PROCEDURE — 25000003 PHARM REV CODE 250: Performed by: INTERNAL MEDICINE

## 2020-09-18 PROCEDURE — 36415 COLL VENOUS BLD VENIPUNCTURE: CPT

## 2020-09-18 PROCEDURE — 21400001 HC TELEMETRY ROOM

## 2020-09-18 PROCEDURE — 97530 THERAPEUTIC ACTIVITIES: CPT

## 2020-09-18 PROCEDURE — 85025 COMPLETE CBC W/AUTO DIFF WBC: CPT

## 2020-09-18 PROCEDURE — 80048 BASIC METABOLIC PNL TOTAL CA: CPT

## 2020-09-18 PROCEDURE — 80048 BASIC METABOLIC PNL TOTAL CA: CPT | Mod: 91

## 2020-09-18 PROCEDURE — S4991 NICOTINE PATCH NONLEGEND: HCPCS | Performed by: FAMILY MEDICINE

## 2020-09-18 PROCEDURE — 84145 PROCALCITONIN (PCT): CPT

## 2020-09-18 PROCEDURE — 63600175 PHARM REV CODE 636 W HCPCS: Performed by: FAMILY MEDICINE

## 2020-09-18 PROCEDURE — 97530 THERAPEUTIC ACTIVITIES: CPT | Performed by: PHYSICAL THERAPIST

## 2020-09-18 PROCEDURE — 97116 GAIT TRAINING THERAPY: CPT

## 2020-09-18 PROCEDURE — 92526 ORAL FUNCTION THERAPY: CPT

## 2020-09-18 PROCEDURE — S0030 INJECTION, METRONIDAZOLE: HCPCS | Performed by: FAMILY MEDICINE

## 2020-09-18 PROCEDURE — 97535 SELF CARE MNGMENT TRAINING: CPT | Performed by: PHYSICAL THERAPIST

## 2020-09-18 RX ORDER — SODIUM BICARBONATE 650 MG/1
650 TABLET ORAL ONCE
Status: COMPLETED | OUTPATIENT
Start: 2020-09-18 | End: 2020-09-18

## 2020-09-18 RX ORDER — DEXTROSE MONOHYDRATE 50 MG/ML
INJECTION, SOLUTION INTRAVENOUS CONTINUOUS
Status: DISCONTINUED | OUTPATIENT
Start: 2020-09-18 | End: 2020-09-19 | Stop reason: HOSPADM

## 2020-09-18 RX ORDER — SODIUM CHLORIDE 450 MG/100ML
INJECTION, SOLUTION INTRAVENOUS CONTINUOUS
Status: DISCONTINUED | OUTPATIENT
Start: 2020-09-18 | End: 2020-09-18

## 2020-09-18 RX ORDER — AMOXICILLIN AND CLAVULANATE POTASSIUM 875; 125 MG/1; MG/1
1 TABLET, FILM COATED ORAL EVERY 12 HOURS
Status: DISCONTINUED | OUTPATIENT
Start: 2020-09-18 | End: 2020-09-19 | Stop reason: HOSPADM

## 2020-09-18 RX ORDER — METRONIDAZOLE 500 MG/1
500 TABLET ORAL EVERY 8 HOURS
Status: DISCONTINUED | OUTPATIENT
Start: 2020-09-18 | End: 2020-09-18

## 2020-09-18 RX ADMIN — THERA TABS 1 TABLET: TAB at 08:09

## 2020-09-18 RX ADMIN — AMOXICILLIN AND CLAVULANATE POTASSIUM 1 TABLET: 875; 125 TABLET, FILM COATED ORAL at 11:09

## 2020-09-18 RX ADMIN — HEPARIN SODIUM 5000 UNITS: 5000 INJECTION INTRAVENOUS; SUBCUTANEOUS at 06:09

## 2020-09-18 RX ADMIN — METRONIDAZOLE 500 MG: 500 INJECTION, SOLUTION INTRAVENOUS at 12:09

## 2020-09-18 RX ADMIN — AMOXICILLIN AND CLAVULANATE POTASSIUM 1 TABLET: 875; 125 TABLET, FILM COATED ORAL at 10:09

## 2020-09-18 RX ADMIN — SODIUM BICARBONATE 650 MG: 650 TABLET ORAL at 10:09

## 2020-09-18 RX ADMIN — CEFEPIME HYDROCHLORIDE 2 G: 2 INJECTION, SOLUTION INTRAVENOUS at 08:09

## 2020-09-18 RX ADMIN — HEPARIN SODIUM 5000 UNITS: 5000 INJECTION INTRAVENOUS; SUBCUTANEOUS at 12:09

## 2020-09-18 RX ADMIN — CHLORDIAZEPOXIDE HYDROCHLORIDE 10 MG: 10 CAPSULE ORAL at 12:09

## 2020-09-18 RX ADMIN — Medication 1 PATCH: at 08:09

## 2020-09-18 RX ADMIN — FAMOTIDINE 20 MG: 20 TABLET ORAL at 08:09

## 2020-09-18 RX ADMIN — CHLORDIAZEPOXIDE HYDROCHLORIDE 10 MG: 10 CAPSULE ORAL at 06:09

## 2020-09-18 RX ADMIN — FAMOTIDINE 20 MG: 20 TABLET ORAL at 10:09

## 2020-09-18 RX ADMIN — ASPIRIN 81 MG 81 MG: 81 TABLET ORAL at 08:09

## 2020-09-18 RX ADMIN — CHLORDIAZEPOXIDE HYDROCHLORIDE 10 MG: 10 CAPSULE ORAL at 11:09

## 2020-09-18 RX ADMIN — FOLIC ACID 1 MG: 1 TABLET ORAL at 08:09

## 2020-09-18 RX ADMIN — MUPIROCIN: 20 OINTMENT TOPICAL at 10:09

## 2020-09-18 RX ADMIN — SODIUM CHLORIDE: 0.9 INJECTION, SOLUTION INTRAVENOUS at 08:09

## 2020-09-18 RX ADMIN — METRONIDAZOLE 500 MG: 500 TABLET ORAL at 08:09

## 2020-09-18 RX ADMIN — Medication 100 MG: at 08:09

## 2020-09-18 RX ADMIN — DEXTROSE: 5 SOLUTION INTRAVENOUS at 10:09

## 2020-09-18 NOTE — PT/OT/SLP PROGRESS
Physical Therapy  Treatment    Ty López   MRN: [id number]   Admitting Diagnosis: Pneumonia of right middle lobe due to infectious organism    PT Received On: 09/18/20  PT Start Time: 1025     PT Stop Time: 1050    PT Total Time (min): 25 min       Billable Minutes:  Gait Training 15 and Therapeutic Activity 10    Treatment Type: Treatment  PT/PTA: PT     PTA Visit Number: 0       General Precautions: Standard, fall, vision impaired  Orthopedic Precautions: N/A   Braces: N/A    Subjective:  Communicated with NURSE LARSEN prior to session.  Pain/Comfort  Pain Rating 1: 0/10    Objective:   Patient found with: telemetry, peripheral IV    Functional Mobility:  Therapeutic Activities and Exercises:  PT IN BATHROOM UPON ARRIVAL, NO ASSIST REQUIRED FOR CLEANING, PT DONNED ROBE WITH CGA, MUNIR FOR TOILET TF, PT STOOD AT SINK WITH SBA TO WASH/DRY HANDS, PT AMB 80' X 2 TRIALS WITH RW AND CGA, NO LOB OR SOB ON ROOM AIR, F DYNAMIC BALANCE, PT RETURN TO ROOM, TF BACK TO SUPINE PER HIS REQUEST, ALL NEEDS MET    AM-PAC 6 CLICK MOBILITY  How much help from another person does this patient currently need?   1 = Unable, Total/Dependent Assistance  2 = A lot, Maximum/Moderate Assistance  3 = A little, Minimum/Contact Guard/Supervision  4 = None, Modified [place]/Independent    Turning over in bed (including adjusting bedclothes, sheets and blankets)?: 4  Sitting down on and standing up from a chair with arms (e.g., wheelchair, bedside commode, etc.): 3  Moving from lying on back to sitting on the side of the bed?: 4  Moving to and from a bed to a chair (including a wheelchair)?: 3  Need to walk in hospital room?: 3  Climbing 3-5 steps with a railing?: 1  Basic Mobility Total Score: 18    AM-PAC Raw Score CMS G-Code Modifier Level of Impairment Assistance   6 % Total / Unable   7 - 9 CM 80 - 100% Maximal Assist   10 - 14 CL 60 - 80% Moderate Assist   15 - 19 CK 40 - 60% Moderate Assist   20 - 22 CJ 20 - 40% Minimal  Assist   23 CI 1-20% SBA / CGA   24 CH 0% Independent/ Mod I     Patient left supine with all lines intact, call button in reach, NURSE notified and AIDE present.    Assessment:  Ty López is a 75 y.o. male with a medical diagnosis of Pneumonia of right middle lobe due to infectious organism and presents with IMPAIRED FUNCTIONAL MOBILITY. PT WILL BENEFIT FROM CONT. SKILLED P.T. TO ADDRESS IMPAIRMENTS    Rehab identified problem list/impairments: Rehab identified problem list/impairments: weakness, impaired endurance, impaired balance, gait instability, decreased coordination, impaired functional mobilty    Rehab potential is good.    Activity tolerance: Good    Discharge recommendations: Discharge Facility/Level of Care Needs: home health PT     Barriers to discharge:      Equipment recommendations: Equipment Needed After Discharge: walker, rolling, bath bench     GOALS:   Multidisciplinary Problems     Physical Therapy Goals        Problem: Physical Therapy Goal    Goal Priority Disciplines Outcome Goal Variances Interventions   Physical Therapy Goal     PT, PT/OT Ongoing, Progressing     Description: LTG'S TO BE MET IN 7 DAYS (9-23-20)  1. PT WILL REQUIRE SPV FOR BED MOBILITY  2. PT WILL REQUIRE SBA FOR BED MOBILITY  3. PT WILL ' WITH RW AND SBA                   PLAN:    Patient to be seen 5 x/week  to address the above listed problems via gait training, therapeutic activities, therapeutic exercises  Plan of Care expires: 09/23/20  Plan of Care reviewed with: patient    Stacy Darien, PT  09/18/2020

## 2020-09-18 NOTE — PLAN OF CARE
Pt completed oral motor, tongue base retraction and laryngeal elevation ex x 20 each with min cues to improve swallow function. Pt was educated on dysphagia and compensatory swallow strategies. He expressed understanding by stating strategies back.

## 2020-09-18 NOTE — PLAN OF CARE
Patient lying in bed. No signs and symptoms of acute distress at this time. Flagyl infusing. No ss of withdrawals. Will continue to monitor patient.   Problem: Fall Injury Risk  Goal: Absence of Fall and Fall-Related Injury  Outcome: Ongoing, Progressing     Problem: Adult Inpatient Plan of Care  Goal: Plan of Care Review  Outcome: Ongoing, Progressing  Goal: Patient-Specific Goal (Individualization)  Outcome: Ongoing, Progressing  Goal: Absence of Hospital-Acquired Illness or Injury  Outcome: Ongoing, Progressing  Goal: Optimal Comfort and Wellbeing  Outcome: Ongoing, Progressing  Goal: Readiness for Transition of Care  Outcome: Ongoing, Progressing  Goal: Rounds/Family Conference  Outcome: Ongoing, Progressing     Problem: Electrolyte Imbalance (Acute Kidney Injury/Impairment)  Goal: Serum Electrolyte Balance  Outcome: Ongoing, Progressing     Problem: Fluid Imbalance (Acute Kidney Injury/Impairment)  Goal: Optimal Fluid Balance  Outcome: Ongoing, Progressing     Problem: Hematologic Alteration (Acute Kidney Injury/Impairment)  Goal: Hemoglobin, Hematocrit and Platelets Within Normal Range  Outcome: Ongoing, Progressing     Problem: Oral Intake Inadequate (Acute Kidney Injury/Impairment)  Goal: Optimal Nutrition Intake  Outcome: Ongoing, Progressing     Problem: Renal Function Impairment (Acute Kidney Injury/Impairment)  Goal: Effective Renal Function  Outcome: Ongoing, Progressing     Problem: Infection  Goal: Infection Symptom Resolution  Outcome: Ongoing, Progressing     Problem: Skin Injury Risk Increased  Goal: Skin Health and Integrity  Outcome: Ongoing, Progressing     Problem: Bleeding (Sepsis/Septic Shock)  Goal: Absence of Bleeding  Outcome: Ongoing, Progressing     Problem: Glycemic Control Impaired (Sepsis/Septic Shock)  Goal: Blood Glucose Level Within Desired Range  Outcome: Ongoing, Progressing     Problem: Adjustment to Illness (Sepsis/Septic Shock)  Goal: Optimal Coping  Outcome: Ongoing,  Progressing     Problem: Hemodynamic Instability (Sepsis/Septic Shock)  Goal: Effective Tissue Perfusion  Outcome: Ongoing, Progressing

## 2020-09-18 NOTE — PROGRESS NOTES
Pharmacist Intervention IV to PO Note    Ty López is a 75 y.o. male being treated with IV medication metronidazole    Patient Data:    Vital Signs (Most Recent):  Temp: 97.7 °F (36.5 °C) (09/18/20 0453)  Pulse: 61 (09/18/20 0453)  Resp: 18 (09/18/20 0453)  BP: 134/75 (09/18/20 0453)  SpO2: (!) 94 % (09/18/20 0453)   Vital Signs (72h Range):  Temp:  [95.7 °F (35.4 °C)-98 °F (36.7 °C)]   Pulse:  [49-83]   Resp:  [13-35]   BP: ()/(34-89)   SpO2:  [78 %-100 %]      CBC:  Recent Labs   Lab 09/15/20  1545 09/16/20  0715 09/17/20  0500   WBC 6.38 5.83 5.92   RBC 2.84* 2.78* 2.78*   HGB 9.7* 9.7* 9.4*   HCT 28.4* 27.9* 27.7*    168 157   * 100* 100*   MCH 34.2* 34.9* 33.8*   MCHC 34.2 34.8 33.9     CMP:     Recent Labs   Lab 09/15/20  1545 09/16/20  0715 09/17/20  0500   GLU 92 78 75   CALCIUM 8.3* 7.1* 7.5*   ALBUMIN 2.6* 2.1*  --    PROT 7.5 6.2  --    * 134* 136   K 4.1 4.1 4.2   CO2 15* 16* 16*    112* 115*   BUN 61* 38* 25*   CREATININE 2.7* 1.6* 0.8   ALKPHOS 68 55  --    ALT 7* <5*  --    AST 16 10  --    BILITOT 0.1 0.2  --        Dietary Orders:  Diet Orders            Dietary nutrition supplements [facility]; Boost Plus - Chocolate starting at 09/17 1300    Diet Dysphagia Mechanical Soft (IDDSI Level 5) [facility]; Isolation Tray - Regular [place]; Thin: Dysphagia 2 (Mechanical Soft Ground) starting at 09/16 1551            Based on the following criteria, this patient qualifies for intravenous to oral conversion:  [x] The patients gastrointestinal tract is functioning (tolerating medications via oral or enteral route for 24 hours and tolerating food or enteral feeds for 24 hours).  [x] The patient is hemodynamically stable for 24 hours (heart rate <100 beats per minute, systolic blood pressure >99 mm Hg, and respiratory rate <20 breaths per minute).  [x] The patient shows clinical improvement (afebrile for at least 24 hours and white blood cell count downtrending  or normalized). Additionally, the patient must be non-neutropenic (absolute neutrophil count >500 cells/mm3).  [x] For antimicrobials, the patient has received IV therapy for at least 24 hours.    IV medication metronidazole 500 mg IV will be changed to oral medication metronidazole 500 mg PO    Pharmacist's Name: Macario Connolly  Pharmacist's Extension: 1225

## 2020-09-18 NOTE — PT/OT/SLP PROGRESS
"Occupational Therapy  Treatment    Ty López   MRN: [id number]   Admitting Diagnosis: Pneumonia of right middle lobe due to infectious organism    OT Date of Treatment: 09/18/20   OT Start Time: 1000  OT Stop Time: 1025  OT Total Time (min): 25 min    Billable Minutes:  Self Care/Home Management 12 min and Therapeutic Activity 13 min    General Precautions: Standard, fall, aspiration  Orthopedic Precautions: N/A  Braces: N/A         Subjective:  Communicated with Nurse Leon and epic chart review prior to session.  Pt found sitting on commode and agreeable to tx at this time.   Pain/Comfort  Pain Rating 1: 0/10    Objective:        Functional Mobility:  Therapeutic Activities and Exercises:  Pt performed toilet hygiene with Supervision, sit>stand from toilet with Supervision, donned gown as a robe with SBA in standing. Pt stood at sink with Fair standing balance and performed hand hygiene with set up. Pt performed functional mobility using RW ~200ft with SBA, t/f back to bed with SBA.     AM-PAC 6 CLICK ADL   How much help from another person does this patient currently need?   1 = Unable, Total/Dependent Assistance  2 = A lot, Maximum/Moderate Assistance  3 = A little, Minimum/Contact Guard/Supervision  4 = None, Modified [place]/Independent    Putting on and taking off regular lower body clothing? : 3  Bathing (including washing, rinsing, drying)?: 3  Toileting, which includes using toilet, bedpan, or urinal? : 3  Putting on and taking off regular upper body clothing?: 3  Taking care of personal grooming such as brushing teeth?: 4  Eating meals?: 4  Daily Activity Total Score: 20     AM-PAC Raw Score CMS "G-Code Modifier Level of Impairment Assistance   6 % Total / Unable   7 - 8 CM 80 - 100% Maximal Assist   9-13 CL 60 - 80% Moderate Assist   14 - 19 CK 40 - 60% Moderate Assist   20 - 22 CJ 20 - 40% Minimal Assist   23 CI 1-20% SBA / CGA   24 CH 0% Independent/ Mod I       Patient left HOB elevated " with all lines intact, call button in reach and Nurse Edward notified    ASSESSMENT:  Ty López is a 75 y.o. male with a medical diagnosis of Pneumonia of right middle lobe due to infectious organism and presents with  impaired functional mobility and ADLs. Pt will benefit from continued skilled OT in order to address the listed impairments.    Rehab identified problem list/impairments: Rehab identified problem list/impairments: weakness, impaired endurance, impaired self care skills, impaired functional mobilty, gait instability, impaired balance, decreased upper extremity function, decreased lower extremity function, decreased safety awareness    Rehab potential is good.    Activity tolerance: Good    Discharge recommendations: Discharge Facility/Level of Care Needs: home with home health     Barriers to discharge: Barriers to Discharge: None    Equipment recommendations: walker, rolling     GOALS:   Multidisciplinary Problems     Occupational Therapy Goals        Problem: Occupational Therapy Goal    Goal Priority Disciplines Outcome Interventions   Occupational Therapy Goal     OT, PT/OT Ongoing, Progressing                    Plan:  Patient to be seen 3 x/week to address the above listed problems via self-care/home management, therapeutic activities, therapeutic exercises  Plan of Care expires: 09/23/20  Plan of Care reviewed with: patient         Tesha Jorge, PT/OT  09/18/2020

## 2020-09-18 NOTE — PT/OT/SLP PROGRESS
Speech Language Pathology Treatment    Patient Name:  Ty López   MRN:  [id number]  Admitting Diagnosis: Pneumonia of right middle lobe due to infectious organism    Recommendations:                 General Recommendations:  Dysphagia therapy  Diet recommendations:  Mechanical soft, Liquid Diet Level: Thin   Aspiration Precautions: 1 bite/sip at a time, Alternating bites/sips, Double swallow with each bite/sip, HOB to 90 degrees and Small bites/sips   General Precautions: Standard, fall, aspiration  Communication strategies:  none    Subjective     Pt alert and cooperative and motivated.  Patient goals: none stated    Pain/Comfort:  · Pain Rating 1: 0/10  · Pain Rating Post-Intervention 1: 0/10    Objective:     Has the patient been evaluated by SLP for swallowing?   Yes  Keep patient NPO? No   Current Respiratory Status: nasal cannula      Pt completed oral motor, tongue base retraction and laryngeal elevation ex x 20 each with min cues to improve swallow function. Pt was educated on dysphagia and compensatory swallow strategies. He expressed understanding by stating strategies back.     Assessment:     Ty López is a 75 y.o. male with an SLP diagnosis of Dysphagia.  He presents with risk of aspiration with good recall of strategies..    Goals:   Multidisciplinary Problems     SLP Goals        Problem: SLP Goal    Goal Priority Disciplines Outcome   SLP Goal     SLP Ongoing, Progressing   Description: 1. Pt will complete oral and pharyngeal ex's with min A for increased strength and ROM  2. Pt will tolerate least restrictive diet while maintaining airway integrity                    Plan:     · Patient to be seen:  2 x/week   · Plan of Care expires:  09/23/20  · Plan of Care reviewed with:  patient   · SLP Follow-Up:  Yes       Discharge recommendations:      Barriers to Discharge:  None    Time Tracking:     SLP Treatment Date:   09/18/20  Speech Start Time:  1034  Speech Stop Time:  1050     Speech  Total Time (min):  16 min    Billable Minutes: Treatment Swallowing Dysfunction 16    Umm Huffman CCC-SLP  09/18/2020

## 2020-09-19 VITALS
TEMPERATURE: 97 F | HEART RATE: 59 BPM | DIASTOLIC BLOOD PRESSURE: 87 MMHG | BODY MASS INDEX: 14.96 KG/M2 | WEIGHT: 110.44 LBS | HEIGHT: 72 IN | RESPIRATION RATE: 18 BRPM | SYSTOLIC BLOOD PRESSURE: 161 MMHG | OXYGEN SATURATION: 97 %

## 2020-09-19 LAB
ANION GAP SERPL CALC-SCNC: 6 MMOL/L (ref 8–16)
BASOPHILS # BLD AUTO: 0.03 K/UL (ref 0–0.2)
BASOPHILS NFR BLD: 0.6 % (ref 0–1.9)
BUN SERPL-MCNC: 9 MG/DL (ref 8–23)
CALCIUM SERPL-MCNC: 8 MG/DL (ref 8.7–10.5)
CHLORIDE SERPL-SCNC: 111 MMOL/L (ref 95–110)
CO2 SERPL-SCNC: 17 MMOL/L (ref 23–29)
CREAT SERPL-MCNC: 0.6 MG/DL (ref 0.5–1.4)
DIFFERENTIAL METHOD: ABNORMAL
EOSINOPHIL # BLD AUTO: 0.2 K/UL (ref 0–0.5)
EOSINOPHIL NFR BLD: 3.2 % (ref 0–8)
ERYTHROCYTE [DISTWIDTH] IN BLOOD BY AUTOMATED COUNT: 15.1 % (ref 11.5–14.5)
EST. GFR  (AFRICAN AMERICAN): >60 ML/MIN/1.73 M^2
EST. GFR  (NON AFRICAN AMERICAN): >60 ML/MIN/1.73 M^2
GLUCOSE SERPL-MCNC: 74 MG/DL (ref 70–110)
HCT VFR BLD AUTO: 27 % (ref 40–54)
HGB BLD-MCNC: 9.3 G/DL (ref 14–18)
IMM GRANULOCYTES # BLD AUTO: 0.01 K/UL (ref 0–0.04)
IMM GRANULOCYTES NFR BLD AUTO: 0.2 % (ref 0–0.5)
LYMPHOCYTES # BLD AUTO: 1 K/UL (ref 1–4.8)
LYMPHOCYTES NFR BLD: 20.5 % (ref 18–48)
MAGNESIUM SERPL-MCNC: 1.5 MG/DL (ref 1.6–2.6)
MCH RBC QN AUTO: 33.8 PG (ref 27–31)
MCHC RBC AUTO-ENTMCNC: 34.4 G/DL (ref 32–36)
MCV RBC AUTO: 98 FL (ref 82–98)
MONOCYTES # BLD AUTO: 0.6 K/UL (ref 0.3–1)
MONOCYTES NFR BLD: 12.9 % (ref 4–15)
NEUTROPHILS # BLD AUTO: 3.1 K/UL (ref 1.8–7.7)
NEUTROPHILS NFR BLD: 62.6 % (ref 38–73)
NRBC BLD-RTO: 0 /100 WBC
PHOSPHATE SERPL-MCNC: 2.9 MG/DL (ref 2.7–4.5)
PLATELET # BLD AUTO: 142 K/UL (ref 150–350)
PMV BLD AUTO: 11.3 FL (ref 9.2–12.9)
POTASSIUM SERPL-SCNC: 4.1 MMOL/L (ref 3.5–5.1)
RBC # BLD AUTO: 2.75 M/UL (ref 4.6–6.2)
SODIUM SERPL-SCNC: 134 MMOL/L (ref 136–145)
WBC # BLD AUTO: 4.97 K/UL (ref 3.9–12.7)

## 2020-09-19 PROCEDURE — 84100 ASSAY OF PHOSPHORUS: CPT

## 2020-09-19 PROCEDURE — 36415 COLL VENOUS BLD VENIPUNCTURE: CPT

## 2020-09-19 PROCEDURE — 83735 ASSAY OF MAGNESIUM: CPT

## 2020-09-19 PROCEDURE — 85025 COMPLETE CBC W/AUTO DIFF WBC: CPT

## 2020-09-19 PROCEDURE — 63600175 PHARM REV CODE 636 W HCPCS: Performed by: FAMILY MEDICINE

## 2020-09-19 PROCEDURE — 80048 BASIC METABOLIC PNL TOTAL CA: CPT

## 2020-09-19 PROCEDURE — 25000003 PHARM REV CODE 250: Mod: ER

## 2020-09-19 RX ADMIN — HEPARIN SODIUM 5000 UNITS: 5000 INJECTION INTRAVENOUS; SUBCUTANEOUS at 05:09

## 2020-09-19 RX ADMIN — CHLORDIAZEPOXIDE HYDROCHLORIDE 10 MG: 10 CAPSULE ORAL at 05:09

## 2020-09-19 NOTE — NURSING
Pt wheeled to lobby by staff with all personal belongings and walker for home use for discharge home. Cab voucher signed and given to .

## 2020-09-19 NOTE — PLAN OF CARE
09/19/20 1025   Final Note   Assessment Type Final Discharge Note   Anticipated Discharge Disposition Home-Health   Right Care Referral Info   Post Acute Recommendation Home-care   Facility Name [facility]

## 2020-09-20 NOTE — PROGRESS NOTES
[facility] Hospital Medicine  Progress Note    Patient Name: Ty López  MRN: [id number]  Patient Class: IP- Inpatient   Admission Date: 9/15/2020  Length of Stay: 4 days  Attending Physician: No att. providers found  Primary Care Provider: ADRIAN Zheng        Subjective:     Principal Problem:Pneumonia of right middle lobe due to infectious organism        HPI:  Ty López is a 75 year old male with no known past medical history who presented to the [facility] ED with complaints of dizziness and findings of hypotension. The patient reports being dizzy for months. There is associated weakness that has prevented him from driving. He denies other symptoms including cough, fever, shortness of breath, dysphagia, . The patient reports that for the last three years, he has been drinking at least 6 beers and a pint of gin daily. He used to drink 24 beers, but has cut back in the last several months. In the ED, the patient was found to have a right suprahilar infiltrate on chest x-ray. Labs were significant for an alcohol level of 160, initial lactic acid of 3.5 with a repeat of 1.4, sodium of 131, creatinine of 2.7 with a previous measurement of 0.7 on 4/20/20 and troponin of 0.062. The patient is a full code. His daughter is his surrogate medical decision maker.     Overview/Hospital Course:  9/17:  Patient off of Levophed.  Stepped down from ICU today.  Discontinue vancomycin.  Continue cefepime and Flagyl to cover aspiration pneumonia.  Patient looks poorly nourished.  States that he will go live with his son whenever discharged from the hospital.  9/18 Pt  Denies nay complaint for todaY . He is ready for d/c . He had a NAGMA most likely to NS infusion . The NAGMA improved after Ns was d/c     Interval History:     Review of Systems   Constitutional: Negative for fatigue and fever.   HENT: Negative for sinus pressure.    Eyes: Negative for visual disturbance.   Respiratory: Negative for  shortness of breath.    Cardiovascular: Negative for chest pain.   Gastrointestinal: Negative for nausea and vomiting.   Genitourinary: Negative for difficulty urinating.   Musculoskeletal: Negative for back pain.   Skin: Negative for rash.   Neurological: Positive for weakness. Negative for headaches.   Psychiatric/Behavioral: Negative for confusion.     Objective:     Vital Signs (Most Recent):  Temp: 97.4 °F (36.3 °C) (09/19/20 0409)  Pulse: (!) 59 (09/19/20 0410)  Resp: 18 (09/19/20 0409)  BP: (!) 161/87 (09/19/20 0409)  SpO2: 97 % (09/19/20 0410) Vital Signs (24h Range):        Weight: 50.1 kg (110 lb 7.2 oz)  Body mass index is 14.98 kg/m².  No intake or output data in the 24 hours ending 09/20/20 1616   Physical Exam  Constitutional:       General: He is not in acute distress.     Appearance: He is well-developed. He is ill-appearing. He is not diaphoretic.      Comments: Cachectic   HENT:      Head: Normocephalic and atraumatic.   Eyes:      Pupils: Pupils are equal, round, and reactive to light.   Cardiovascular:      Rate and Rhythm: Normal rate and regular rhythm.      Heart sounds: Normal heart sounds. No murmur. No friction rub. No gallop.    Pulmonary:      Effort: Pulmonary effort is normal. No respiratory distress.      Breath sounds: Normal breath sounds. No stridor. No wheezing or rales.   Abdominal:      General: Bowel sounds are normal. There is no distension.      Palpations: Abdomen is soft. There is no mass.      Tenderness: There is no abdominal tenderness. There is no guarding.   Musculoskeletal:      Right lower leg: No edema.      Left lower leg: No edema.   Skin:     General: Skin is warm.      Findings: No erythema.   Neurological:      Mental Status: He is alert and oriented to person, place, and time.         Significant Labs: All pertinent labs within the past 24 hours have been reviewed.    Significant Imaging: I have reviewed all pertinent imaging results/findings within the past 24  hours.      Assessment/Plan:      * Pneumonia of right middle lobe due to infectious organism  -IV antibiotics, inhaled medications and oxygen therapy as needed.   -Check sputum culture.   -Follow up blood cultures.   -ST evaluation.     9/17:  Currently on cefepime and Flagyl  Initial procalcitonin negative  Will plan to repeat procalcitonin a.m.  If procalcitonin continues to be negative will plan to deescalate antibiotic therapy  Likely able to discharge tomorrow with home health with PT OT     Oropharyngeal dysphagia  9/17:  Speech therapy on case  Diet started      Severe malnutrition        Elevated troponin  -Possibly due to renal failure.   -Trend troponin.   -ASA daily.       Hyponatremia  -Due to alcoholism and hypovolemia.   -Continue hydration and monitor.       Alcohol abuse  -Patient counseled on cessation.   -Ativan for DT symptoms and Librium for DT prophylaxis.   -MVI, thiamine and folic acid.         LOPEZ (acute kidney injury)  -Likely due to hypoperfusion associated with shock.   -Continue IV fluids and monitor.     9/17:  Creatinine trended down to 0.80  Continue monitor      VTE Risk Mitigation (From admission, onward)         Ordered     IP VTE HIGH RISK PATIENT  Once      09/16/20 0303                Discharge Planning   KARAN: 9/18/2020     Code Status: Prior   Is the patient medically ready for discharge?:     Reason for patient still in hospital (select all that apply): Pending disposition  Discharge Plan A: Home, Home with family, Home Health   Discharge Delays: (!) Other              Cornelius Vu MD  Department of Hospital Medicine   [facility]

## 2020-09-20 NOTE — SUBJECTIVE & OBJECTIVE
Interval History:     Review of Systems   Constitutional: Negative for fatigue and fever.   HENT: Negative for sinus pressure.    Eyes: Negative for visual disturbance.   Respiratory: Negative for shortness of breath.    Cardiovascular: Negative for chest pain.   Gastrointestinal: Negative for nausea and vomiting.   Genitourinary: Negative for difficulty urinating.   Musculoskeletal: Negative for back pain.   Skin: Negative for rash.   Neurological: Positive for weakness. Negative for headaches.   Psychiatric/Behavioral: Negative for confusion.     Objective:     Vital Signs (Most Recent):  Temp: 97.4 °F (36.3 °C) (09/19/20 0409)  Pulse: (!) 59 (09/19/20 0410)  Resp: 18 (09/19/20 0409)  BP: (!) 161/87 (09/19/20 0409)  SpO2: 97 % (09/19/20 0410) Vital Signs (24h Range):        Weight: 50.1 kg (110 lb 7.2 oz)  Body mass index is 14.98 kg/m².  No intake or output data in the 24 hours ending 09/20/20 1616   Physical Exam  Constitutional:       General: He is not in acute distress.     Appearance: He is well-developed. He is ill-appearing. He is not diaphoretic.      Comments: Cachectic   HENT:      Head: Normocephalic and atraumatic.   Eyes:      Pupils: Pupils are equal, round, and reactive to light.   Cardiovascular:      Rate and Rhythm: Normal rate and regular rhythm.      Heart sounds: Normal heart sounds. No murmur. No friction rub. No gallop.    Pulmonary:      Effort: Pulmonary effort is normal. No respiratory distress.      Breath sounds: Normal breath sounds. No stridor. No wheezing or rales.   Abdominal:      General: Bowel sounds are normal. There is no distension.      Palpations: Abdomen is soft. There is no mass.      Tenderness: There is no abdominal tenderness. There is no guarding.   Musculoskeletal:      Right lower leg: No edema.      Left lower leg: No edema.   Skin:     General: Skin is warm.      Findings: No erythema.   Neurological:      Mental Status: He is alert and oriented to person, place,  and time.         Significant Labs: All pertinent labs within the past 24 hours have been reviewed.    Significant Imaging: I have reviewed all pertinent imaging results/findings within the past 24 hours.

## 2020-09-20 NOTE — DISCHARGE SUMMARY
[facility] Hospital Medicine  Discharge Summary      Patient Name: Ty López  MRN: [id number]  Admission Date: 9/15/2020  Hospital Length of Stay: 4 days  Discharge Date and Time: 9/19/2020  9:53 AM  Attending Physician: No att. providers found   Discharging Provider: Cornelius Vu MD  Primary Care Provider: ADRIAN Zheng      HPI:   Ty López is a 75 year old male with no known past medical history who presented to the [facility] ED with complaints of dizziness and findings of hypotension. The patient reports being dizzy for months. There is associated weakness that has prevented him from driving. He denies other symptoms including cough, fever, shortness of breath, dysphagia, . The patient reports that for the last three years, he has been drinking at least 6 beers and a pint of gin daily. He used to drink 24 beers, but has cut back in the last several months. In the ED, the patient was found to have a right suprahilar infiltrate on chest x-ray. Labs were significant for an alcohol level of 160, initial lactic acid of 3.5 with a repeat of 1.4, sodium of 131, creatinine of 2.7 with a previous measurement of 0.7 on 4/20/20 and troponin of 0.062. The patient is a full code. His daughter is his surrogate medical decision maker.     * No surgery found *      Hospital Course:   9/17:  Patient off of Levophed.  Stepped down from ICU today.  Discontinue vancomycin.  Continue cefepime and Flagyl to cover aspiration pneumonia.  Patient looks poorly nourished.  States that he will go live with his son whenever discharged from the hospital.  9/18 Pt  Denies nay complaint for todaY . He is ready for d/c . He had a NAGMA most likely to NS infusion . The NAGMA improved after Ns was d/c   9/19 Pt was seen and examined at bedside . He was determined to be suitable for d/c  He will be d/c home with HH   NAGMA  Due to NS infusion.     Consults:   Consults (From admission, onward)         Status Ordering Provider     Inpatient consult to Pulmonology  Once     Provider:  LIANA Melgoza-BC    Completed ONEL WILL     Inpatient consult to Registered Dietitian/Nutritionist  Once     Provider:  (Not yet assigned)    Completed MELQUIADES STOUT          * Pneumonia of right middle lobe due to infectious organism  -IV antibiotics, inhaled medications and oxygen therapy as needed.   -Check sputum culture.   -Follow up blood cultures.   -ST evaluation.     9/17:  Currently on cefepime and Flagyl  Initial procalcitonin negative  Will plan to repeat procalcitonin a.m.  If procalcitonin continues to be negative will plan to deescalate antibiotic therapy  Likely able to discharge tomorrow with home health with PT OT     Oropharyngeal dysphagia  9/17:  Speech therapy on case  Diet started      Severe malnutrition        Elevated troponin  -Possibly due to renal failure.   -Trend troponin.   -ASA daily.       Hyponatremia  -Due to alcoholism and hypovolemia.   -Continue hydration and monitor.       Alcohol abuse  -Patient counseled on cessation.   -Ativan for DT symptoms and Librium for DT prophylaxis.   -MVI, thiamine and folic acid.         LOPEZ (acute kidney injury)  -Likely due to hypoperfusion associated with shock.   -Continue IV fluids and monitor.     9/17:  Creatinine trended down to 0.80  Continue monitor      Final Active Diagnoses:    Diagnosis Date Noted POA    PRINCIPAL PROBLEM:  Pneumonia of right middle lobe due to infectious organism [J18.1] 04/20/2020 Yes    Alcohol abuse [F10.10] 09/16/2020 Yes    Hyponatremia [E87.1] 09/16/2020 Yes    Elevated troponin [R79.89] 09/16/2020 Yes    Severe malnutrition [E43] 09/16/2020 Yes    Oropharyngeal dysphagia [R13.12] 09/16/2020 Yes    LOPEZ (acute kidney injury) [N17.9] 09/15/2020 Yes      Problems Resolved During this Admission:    Diagnosis Date Noted Date Resolved POA    SOB (shortness of breath) [R06.02] 09/17/2020 09/18/2020 Yes    Septic shock  "[A41.9, R65.21] 09/16/2020 09/17/2020 Yes       Discharged Condition: stable    Disposition: Home-Health Care [facility]    Follow Up:  Follow-up Information     ADRIAN Zheng.    Specialty: Family Medicine  Contact information:  Kerri [address]  [address] [facility]  [place] [address]  [phone]                 Patient Instructions:      BATH/SHOWER CHAIR FOR HOME USE     Order Specific Question Answer Comments   Height: 6' (1.829 m)    Weight: 50 kg (110 lb 3.7 oz)    Does patient have medical equipment at home? none    Length of need (1-99 months): 99    Type: With back      WALKER FOR HOME USE     Order Specific Question Answer Comments   Type of Walker: Adult (5'4"-6'6")    With wheels? Yes    Height: 6' (1.829 m)    Weight: 50 kg (110 lb 3.7 oz)    Length of need (1-99 months): 99    Does patient have medical equipment at home? none    Please check all that apply: Patient's condition impairs ambulation.    Please check all that apply: Patient is unable to safely ambulate without equipment.    Vendor: [employer]    Expected Date of Delivery: 9/19/2020 delivered to bedside.     Ambulatory referral/consult to Home Health   Standing Status: Future   Referral Priority: Routine Referral Type: Home Health   Referral Reason: Specialty Services Required   Requested Specialty: Home Health Services   Number of Visits Requested: 1     Diet Cardiac     Notify your health care provider if you experience any of the following:  temperature >100.4     Notify your health care provider if you experience any of the following:  persistent nausea and vomiting or diarrhea     Notify your health care provider if you experience any of the following:  severe uncontrolled pain     Notify your health care provider if you experience any of the following:  redness, tenderness, or signs of infection (pain, swelling, redness, odor or green/yellow discharge around incision site)     Notify your health care provider if you " experience any of the following:  difficulty breathing or increased cough     Notify your health care provider if you experience any of the following:  severe persistent headache     Notify your health care provider if you experience any of the following:  worsening rash     Notify your health care provider if you experience any of the following:  persistent dizziness, light-headedness, or visual disturbances     Notify your health care provider if you experience any of the following:  increased confusion or weakness     Notify your health care provider if you experience any of the following:     Activity as tolerated       Significant Diagnostic Studies: Labs:   BMP:   Recent Labs   Lab 09/19/20  0703   GLU 74   *   K 4.1   *   CO2 17*   BUN 9   CREATININE 0.6   CALCIUM 8.0*   MG 1.5*   , CMP   Recent Labs   Lab 09/19/20  0703   *   K 4.1   *   CO2 17*   GLU 74   BUN 9   CREATININE 0.6   CALCIUM 8.0*   ANIONGAP 6*   ESTGFRAFRICA >60   EGFRNONAA >60    and CBC   Recent Labs   Lab 09/19/20  0703   WBC 4.97   HGB 9.3*   HCT 27.0*   *     Microbiology:   Blood Culture   Lab Results   Component Value Date    LABBLOO No growth to date 09/15/2020    LABBLOO No Growth to date 09/15/2020    LABBLOO No Growth to date 09/15/2020       Pending Diagnostic Studies:     None         Medications:  Reconciled Home Medications:      Medication List      CONTINUE taking these medications    albuterol 90 mcg/actuation inhaler  Commonly known as: PROVENTIL/VENTOLIN HFA  Inhale 2 puffs into the lungs every 6 (six) hours as needed for Wheezing.     lisinopriL 10 MG tablet     naproxen 500 MG tablet  Commonly known as: NAPROSYN  Take 1 tablet (500 mg total) by mouth 2 (two) times daily with meals.        STOP taking these medications    amoxicillin-clavulanate 875-125mg 875-125 mg per tablet  Commonly known as: AUGMENTIN     azithromycin 250 MG tablet  Commonly known as: ZITHROMAX Z-GEORGES            Indwelling  Lines/Drains at time of discharge:   Lines/Drains/Airways     None                 Time spent on the discharge of patient: 35 minutes  Patient was seen and examined on the date of discharge and determined to be suitable for discharge.         Cornelius Vu MD  Department of Hospital Medicine  [facility]

## 2020-09-21 LAB
BACTERIA BLD CULT: NORMAL
BACTERIA BLD CULT: NORMAL

## 2020-09-29 ENCOUNTER — DOCUMENT SCAN (OUTPATIENT)
Dept: HOME HEALTH SERVICES | Facility: HOSPITAL | Age: 75
End: 2020-09-29
Payer: MEDICARE

## 2020-10-01 ENCOUNTER — EXTERNAL HOME HEALTH (OUTPATIENT)
Dept: HOME HEALTH SERVICES | Facility: HOSPITAL | Age: 75
End: 2020-10-01
Payer: MEDICARE

## 2020-11-12 ENCOUNTER — LAB VISIT (OUTPATIENT)
Dept: LAB | Facility: HOSPITAL | Age: 75
End: 2020-11-12
Attending: NURSE PRACTITIONER
Payer: MEDICARE

## 2020-11-12 ENCOUNTER — OFFICE VISIT (OUTPATIENT)
Dept: INTERNAL MEDICINE | Facility: CLINIC | Age: 75
End: 2020-11-12
Payer: MEDICARE

## 2020-11-12 VITALS
TEMPERATURE: 99 F | HEIGHT: 72 IN | BODY MASS INDEX: 15.89 KG/M2 | OXYGEN SATURATION: 98 % | SYSTOLIC BLOOD PRESSURE: 162 MMHG | DIASTOLIC BLOOD PRESSURE: 92 MMHG | HEART RATE: 85 BPM | WEIGHT: 117.31 LBS

## 2020-11-12 DIAGNOSIS — I10 ESSENTIAL HYPERTENSION: Primary | ICD-10-CM

## 2020-11-12 DIAGNOSIS — E87.1 HYPONATREMIA: ICD-10-CM

## 2020-11-12 DIAGNOSIS — F10.10 ALCOHOL ABUSE: ICD-10-CM

## 2020-11-12 DIAGNOSIS — E43 SEVERE MALNUTRITION: ICD-10-CM

## 2020-11-12 DIAGNOSIS — D64.9 ANEMIA, UNSPECIFIED TYPE: ICD-10-CM

## 2020-11-12 DIAGNOSIS — J43.9 PULMONARY EMPHYSEMA, UNSPECIFIED EMPHYSEMA TYPE: ICD-10-CM

## 2020-11-12 DIAGNOSIS — Z12.11 COLON CANCER SCREENING: ICD-10-CM

## 2020-11-12 DIAGNOSIS — R06.02 SHORTNESS OF BREATH: ICD-10-CM

## 2020-11-12 DIAGNOSIS — Z72.0 TOBACCO USE: ICD-10-CM

## 2020-11-12 DIAGNOSIS — N17.9 AKI (ACUTE KIDNEY INJURY): ICD-10-CM

## 2020-11-12 LAB
ALBUMIN SERPL BCP-MCNC: 3.4 G/DL (ref 3.5–5.2)
ALP SERPL-CCNC: 61 U/L (ref 55–135)
ALT SERPL W/O P-5'-P-CCNC: 12 U/L (ref 10–44)
ANION GAP SERPL CALC-SCNC: 5 MMOL/L (ref 8–16)
AST SERPL-CCNC: 13 U/L (ref 10–40)
BASOPHILS # BLD AUTO: 0.02 K/UL (ref 0–0.2)
BASOPHILS NFR BLD: 0.3 % (ref 0–1.9)
BILIRUB SERPL-MCNC: 0.2 MG/DL (ref 0.1–1)
BUN SERPL-MCNC: 24 MG/DL (ref 8–23)
CALCIUM SERPL-MCNC: 9.2 MG/DL (ref 8.7–10.5)
CHLORIDE SERPL-SCNC: 115 MMOL/L (ref 95–110)
CO2 SERPL-SCNC: 18 MMOL/L (ref 23–29)
CREAT SERPL-MCNC: 1.2 MG/DL (ref 0.5–1.4)
DIFFERENTIAL METHOD: ABNORMAL
EOSINOPHIL # BLD AUTO: 0.2 K/UL (ref 0–0.5)
EOSINOPHIL NFR BLD: 2.9 % (ref 0–8)
ERYTHROCYTE [DISTWIDTH] IN BLOOD BY AUTOMATED COUNT: 15.4 % (ref 11.5–14.5)
EST. GFR  (AFRICAN AMERICAN): >60 ML/MIN/1.73 M^2
EST. GFR  (NON AFRICAN AMERICAN): 59.6 ML/MIN/1.73 M^2
GLUCOSE SERPL-MCNC: 71 MG/DL (ref 70–110)
HCT VFR BLD AUTO: 28.8 % (ref 40–54)
HGB BLD-MCNC: 9.1 G/DL (ref 14–18)
IMM GRANULOCYTES # BLD AUTO: 0.03 K/UL (ref 0–0.04)
IMM GRANULOCYTES NFR BLD AUTO: 0.4 % (ref 0–0.5)
LYMPHOCYTES # BLD AUTO: 2.2 K/UL (ref 1–4.8)
LYMPHOCYTES NFR BLD: 28.4 % (ref 18–48)
MCH RBC QN AUTO: 30.6 PG (ref 27–31)
MCHC RBC AUTO-ENTMCNC: 31.6 G/DL (ref 32–36)
MCV RBC AUTO: 97 FL (ref 82–98)
MONOCYTES # BLD AUTO: 0.8 K/UL (ref 0.3–1)
MONOCYTES NFR BLD: 10 % (ref 4–15)
NEUTROPHILS # BLD AUTO: 4.6 K/UL (ref 1.8–7.7)
NEUTROPHILS NFR BLD: 58 % (ref 38–73)
NRBC BLD-RTO: 0 /100 WBC
PLATELET # BLD AUTO: 251 K/UL (ref 150–350)
PMV BLD AUTO: 9 FL (ref 9.2–12.9)
POTASSIUM SERPL-SCNC: 5.6 MMOL/L (ref 3.5–5.1)
PROT SERPL-MCNC: 8.5 G/DL (ref 6–8.4)
RBC # BLD AUTO: 2.97 M/UL (ref 4.6–6.2)
SODIUM SERPL-SCNC: 138 MMOL/L (ref 136–145)
WBC # BLD AUTO: 7.83 K/UL (ref 3.9–12.7)

## 2020-11-12 PROCEDURE — 80061 LIPID PANEL: CPT

## 2020-11-12 PROCEDURE — 36415 COLL VENOUS BLD VENIPUNCTURE: CPT | Mod: PO

## 2020-11-12 PROCEDURE — 99214 OFFICE O/P EST MOD 30 MIN: CPT | Mod: S$PBB,ICN,, | Performed by: NURSE PRACTITIONER

## 2020-11-12 PROCEDURE — 80053 COMPREHEN METABOLIC PANEL: CPT | Mod: PO

## 2020-11-12 PROCEDURE — 99999 PR PBB SHADOW E&M-EST. PATIENT-LVL IV: CPT | Mod: PBBFAC,,, | Performed by: NURSE PRACTITIONER

## 2020-11-12 PROCEDURE — 90694 VACC AIIV4 NO PRSRV 0.5ML IM: CPT | Mod: PBBFAC,PO

## 2020-11-12 PROCEDURE — 99999 PR PBB SHADOW E&M-EST. PATIENT-LVL IV: ICD-10-PCS | Mod: PBBFAC,,, | Performed by: NURSE PRACTITIONER

## 2020-11-12 PROCEDURE — 99214 OFFICE O/P EST MOD 30 MIN: CPT | Mod: PBBFAC,PO,25 | Performed by: NURSE PRACTITIONER

## 2020-11-12 PROCEDURE — 99214 PR OFFICE/OUTPT VISIT, EST, LEVL IV, 30-39 MIN: ICD-10-PCS | Mod: S$PBB,ICN,, | Performed by: NURSE PRACTITIONER

## 2020-11-12 PROCEDURE — G0008 ADMIN INFLUENZA VIRUS VAC: HCPCS | Mod: PBBFAC,PO

## 2020-11-12 PROCEDURE — 85025 COMPLETE CBC W/AUTO DIFF WBC: CPT | Mod: PO

## 2020-11-12 RX ORDER — AMLODIPINE BESYLATE 5 MG/1
5 TABLET ORAL DAILY
Qty: 30 TABLET | Refills: 0 | Status: SHIPPED | OUTPATIENT
Start: 2020-11-12 | End: 2021-01-04

## 2020-11-12 NOTE — PROGRESS NOTES
Subjective:       Patient ID: Ty López is a 75 y.o. male.    Chief Complaint: Hypertension    Mr. López presents to clinic escorted by son. Had hospitalization in Sept 2020 for LOPEZ, pneumonia, sepsis, SOB, and anemia. Has been living with son since discharge. [facility] at home two days a week with PT once week. Previous PCP was Darian Kingsley in [place]. Has not had repeat labs since hospital visit. Only saw previous PCP once since hospitalization. Here to establish care as new pt. Continues to have elevated BP at home per son. Reports SOB has been going on since hospital visit. Continues to spoke 2-3 PPD of cigarettes. Quit ETOH use since discharge. States that HH told him that he needs cardiology referral.     Hypertension  This is a chronic problem. The current episode started more than 1 year ago. The problem is uncontrolled. Associated symptoms include headaches and shortness of breath. Pertinent negatives include no anxiety, blurred vision, chest pain, malaise/fatigue, neck pain, palpitations or peripheral edema. There are no associated agents to hypertension. Risk factors for coronary artery disease include smoking/tobacco exposure, male gender and sedentary lifestyle. Past treatments include ACE inhibitors. The current treatment provides mild improvement. There is no history of angina, kidney disease, CVA, heart failure, PVD or retinopathy.       Patient Active Problem List   Diagnosis    Pneumonia of right middle lobe due to infectious organism    LOPEZ (acute kidney injury)    Alcohol abuse    Hyponatremia    Elevated troponin    Severe malnutrition    Oropharyngeal dysphagia    Acute cystitis without hematuria    Essential hypertension    Tobacco use    Pulmonary emphysema       History reviewed. No pertinent family history.  Past Surgical History:   Procedure Laterality Date    ABDOMINAL SURGERY           Current Outpatient Medications:     albuterol (PROVENTIL/VENTOLIN HFA) 90  mcg/actuation inhaler, Inhale 2 puffs into the lungs every 6 (six) hours as needed for Wheezing., Disp: 18 g, Rfl: 11    amLODIPine (NORVASC) 5 MG tablet, Take 1 tablet (5 mg total) by mouth once daily., Disp: 30 tablet, Rfl: 0    carvediloL (COREG) 6.25 MG tablet, Take 1 tablet (6.25 mg total) by mouth 2 (two) times daily with meals., Disp: 60 tablet, Rfl: 11    cyanocobalamin (VITAMIN B-12) 100 MCG tablet, Take 100 mcg by mouth once daily., Disp: , Rfl:     tiZANidine (ZANAFLEX) 2 MG tablet, Take 1 tablet (2 mg total) by mouth daily as needed., Disp: 30 tablet, Rfl: 3    Review of Systems   Constitutional: Positive for fatigue. Negative for chills, fever and malaise/fatigue.   Eyes: Negative for blurred vision, photophobia and visual disturbance.   Respiratory: Positive for shortness of breath.    Cardiovascular: Negative for chest pain and palpitations.   Gastrointestinal: Negative for abdominal pain, nausea and vomiting.   Musculoskeletal: Negative for gait problem, myalgias and neck pain.   Neurological: Positive for headaches. Negative for dizziness, syncope and light-headedness.       Objective:   BP (!) 162/92 (BP Location: Left arm, Patient Position: Sitting, BP Method: Medium (Manual))   Pulse 85   Temp 98.8 °F (37.1 °C) (Temporal)   Ht 6' (1.829 m)   Wt 53.2 kg (117 lb 4.6 oz)   SpO2 98%   BMI 15.91 kg/m²      Physical Exam  Constitutional:       General: He is not in acute distress.     Appearance: Normal appearance. He is not ill-appearing.   HENT:      Head: Normocephalic and atraumatic.   Neck:      Musculoskeletal: Normal range of motion and neck supple.   Cardiovascular:      Rate and Rhythm: Normal rate and regular rhythm.      Pulses: Normal pulses.      Heart sounds: Normal heart sounds. No murmur. No friction rub. No gallop.    Pulmonary:      Effort: Pulmonary effort is normal.      Breath sounds: Decreased breath sounds (diffuse) present.   Musculoskeletal: Normal range of motion.       Right lower leg: No edema.      Left lower leg: No edema.   Skin:     General: Skin is warm and dry.      Coloration: Skin is not pale.      Findings: No erythema.   Neurological:      General: No focal deficit present.      Mental Status: He is alert and oriented to person, place, and time.         Assessment & Plan     Problem List Items Addressed This Visit        Psychiatric    Alcohol abuse    Current Assessment & Plan     Quit since discharge from hospital. Encouraged continued ETOH abstinence.             Pulmonary    Pulmonary emphysema    Overview     9/16/2020 CT chest: Lungs: Severe emphysematous changes throughout the lungs greatest within the upper lobes, right greater than left.  Nodular infiltrates are seen within the right upper lobe concerning for airspace disease/pneumonia.  Follow-up is recommended to exclude underlying neoplastic process.  Marking Actos is noted.         Current Assessment & Plan     Most likely cause of SOB. Counseled on importance of smoke cessation.          Relevant Orders    Lipid Panel (Completed)       Cardiac/Vascular    Essential hypertension - Primary    Current Assessment & Plan     BP elevated today. Changing to amlodipine since pt was having leg edema and is only on 10 of lisinopril prior to appt. Will most likely have to increase dose at next visit.          Relevant Medications    amLODIPine (NORVASC) 5 MG tablet    Other Relevant Orders    Ambulatory referral/consult to Cardiology       Renal/    LOPEZ (acute kidney injury)    Current Assessment & Plan     Labs today.          Relevant Orders    Comprehensive Metabolic Panel (Completed)    Hyponatremia    Current Assessment & Plan     Labs today.          Relevant Orders    Comprehensive Metabolic Panel (Completed)       Endocrine    Severe malnutrition    Relevant Orders    Comprehensive Metabolic Panel (Completed)    Lipid Panel (Completed)       Other    Tobacco use    Current Assessment & Plan     Approx  "3ppd. Counseled on importance of smoke cessation in order to improve quality of life, and prevent worsening of comorbidities.            Other Visit Diagnoses     Anemia, unspecified type        Relevant Orders    CBC Auto Differential (Completed)    Colon cancer screening        Relevant Orders    Cologuard Screening (Multitarget Stool DNA)    Shortness of breath        Relevant Orders    Comprehensive Metabolic Panel (Completed)    CBC Auto Differential (Completed)    Ambulatory referral/consult to Cardiology           Follow up in about 2 weeks (around 11/26/2020) for hypertension.          Portions of this note may have been created with voice recognition software. Occasional "wrong-word" or "sound-a-like" substitutions may have occurred due to the inherent limitations of voice recognition software. Please, read the note carefully and recognize, using context, where substitutions have occurred.       "

## 2020-11-13 DIAGNOSIS — E87.5 HYPERKALEMIA: Primary | ICD-10-CM

## 2020-11-13 LAB
CHOLEST SERPL-MCNC: 116 MG/DL (ref 120–199)
CHOLEST/HDLC SERPL: 2.6 {RATIO} (ref 2–5)
HDLC SERPL-MCNC: 44 MG/DL (ref 40–75)
HDLC SERPL: 37.9 % (ref 20–50)
LDLC SERPL CALC-MCNC: 56.8 MG/DL (ref 63–159)
NONHDLC SERPL-MCNC: 72 MG/DL
TRIGL SERPL-MCNC: 76 MG/DL (ref 30–150)

## 2020-11-18 ENCOUNTER — TELEPHONE (OUTPATIENT)
Dept: CARDIOLOGY | Facility: CLINIC | Age: 75
End: 2020-11-18

## 2020-11-18 ENCOUNTER — OFFICE VISIT (OUTPATIENT)
Dept: CARDIOLOGY | Facility: CLINIC | Age: 75
End: 2020-11-18
Payer: MEDICARE

## 2020-11-18 VITALS
SYSTOLIC BLOOD PRESSURE: 166 MMHG | HEART RATE: 71 BPM | WEIGHT: 113.56 LBS | OXYGEN SATURATION: 98 % | BODY MASS INDEX: 15.4 KG/M2 | DIASTOLIC BLOOD PRESSURE: 82 MMHG

## 2020-11-18 DIAGNOSIS — R06.02 SHORTNESS OF BREATH: ICD-10-CM

## 2020-11-18 DIAGNOSIS — I24.89 DEMAND ISCHEMIA: ICD-10-CM

## 2020-11-18 DIAGNOSIS — M79.10 MUSCLE PAIN: ICD-10-CM

## 2020-11-18 DIAGNOSIS — I10 ESSENTIAL HYPERTENSION: Primary | ICD-10-CM

## 2020-11-18 PROCEDURE — 99213 OFFICE O/P EST LOW 20 MIN: CPT | Mod: PBBFAC,PO | Performed by: INTERNAL MEDICINE

## 2020-11-18 PROCEDURE — 99204 PR OFFICE/OUTPT VISIT, NEW, LEVL IV, 45-59 MIN: ICD-10-PCS | Mod: S$PBB,,, | Performed by: INTERNAL MEDICINE

## 2020-11-18 PROCEDURE — 99999 PR PBB SHADOW E&M-EST. PATIENT-LVL III: ICD-10-PCS | Mod: PBBFAC,,, | Performed by: INTERNAL MEDICINE

## 2020-11-18 PROCEDURE — 99999 PR PBB SHADOW E&M-EST. PATIENT-LVL III: CPT | Mod: PBBFAC,,, | Performed by: INTERNAL MEDICINE

## 2020-11-18 PROCEDURE — 99204 OFFICE O/P NEW MOD 45 MIN: CPT | Mod: S$PBB,,, | Performed by: INTERNAL MEDICINE

## 2020-11-18 RX ORDER — TIZANIDINE 2 MG/1
4 TABLET ORAL DAILY PRN
Qty: 30 TABLET | Refills: 3 | Status: SHIPPED | OUTPATIENT
Start: 2020-11-18 | End: 2020-11-18

## 2020-11-18 RX ORDER — TIZANIDINE 2 MG/1
2 TABLET ORAL DAILY PRN
Qty: 30 TABLET | Refills: 3 | Status: SHIPPED | OUTPATIENT
Start: 2020-11-18 | End: 2020-12-01 | Stop reason: ALTCHOICE

## 2020-11-18 RX ORDER — CARVEDILOL 6.25 MG/1
6.25 TABLET ORAL 2 TIMES DAILY WITH MEALS
Qty: 60 TABLET | Refills: 11 | Status: SHIPPED | OUTPATIENT
Start: 2020-11-18 | End: 2021-11-18

## 2020-11-18 RX ORDER — CARVEDILOL 6.25 MG/1
6.25 TABLET ORAL 2 TIMES DAILY WITH MEALS
Qty: 60 TABLET | Refills: 11 | Status: SHIPPED | OUTPATIENT
Start: 2020-11-18 | End: 2020-11-18

## 2020-11-18 RX ORDER — PNV NO.95/FERROUS FUM/FOLIC AC 28MG-0.8MG
100 TABLET ORAL DAILY
COMMUNITY

## 2020-11-18 NOTE — TELEPHONE ENCOUNTER
Contacted Pharmacy in regards to changing address for pt to receive medications.      ----- Message from Juana Bermudez MA sent at 11/18/2020 10:25 AM CST -----  Contact: alicia    ----- Message -----  From: Lana Garcia  Sent: 11/18/2020   9:59 AM CST  To: Laurie Breen Staff    .Type:  Needs Medical Advice    Who Called:  adwoa   Symptoms (please be specific):   How long has patient had these symptoms:   Pharmacy name and phone #:   Would the patient rather a call back or a response via [facility]?   Call    Best Call Back Number: [phone]  Additional Information: Caller is requesting a call back from the nurse in regards to the office e scrib going to the wrong fax please f

## 2020-11-18 NOTE — PROGRESS NOTES
Subjective:   Patient ID:  Ty López is a 75 y.o. male who presents for evaluation of No chief complaint on file.      HPI:   Ty López is a 75 year old male with no known past medical history who presented to the [facility] ED with complaints of dizziness and findings of hypotension in September 2020. He was found to have pneumonia with sepsis and was treated inpatient.      Hospital Course: copied from discharge summary  9/17:  Patient off of Levophed.  Stepped down from ICU today.  Discontinue vancomycin.  Continue cefepime and Flagyl to cover aspiration pneumonia.  Patient looks poorly nourished.  States that he will go live with his son whenever discharged from the hospital.  9/18 Pt  Denies nay complaint for todaY . He is ready for d/c . He had a NAGMA most likely to NS infusion . The NAGMA improved after Ns was d/c   9/19 Pt was seen and examined at bedside . He was determined to be suitable for d/c  He will be d/c home with HH   NAGMA  Due to NS infusion.       Interval hx:   Starting on norvasc 5mg by PCP since discharge  Since discharge he still feels dyspnea, with less than a mile, no lower ext swelling, +orthopnea  Denies any chest pain. Reports dizziness, no alleviating or exacerbating factors  Denies any palpitations, no loc, no syncope or presyncope.   No claudication      Past Medical History:   Diagnosis Date    Blind left eye        Past Surgical History:   Procedure Laterality Date    ABDOMINAL SURGERY         Social History     Tobacco Use    Smoking status: Current Every Day Smoker     Packs/day: 2.00     Types: Cigarettes    Smokeless tobacco: Never Used   Substance Use Topics    Alcohol use: Not Currently     Alcohol/week: 8.0 standard drinks     Types: 8 Cans of beer per week     Comment: beer daily    Drug use: No       History reviewed. No pertinent family history.    Review of Systems   Constitution: Negative for fever and malaise/fatigue.   HENT: Negative for sore  throat.    Eyes: Negative for blurred vision.   Cardiovascular: Positive for dyspnea on exertion. Negative for chest pain, claudication, cyanosis, irregular heartbeat, leg swelling, near-syncope, orthopnea, palpitations, paroxysmal nocturnal dyspnea and syncope.   Respiratory: Negative for cough, hemoptysis and shortness of breath.    Hematologic/Lymphatic: Negative for bleeding problem.   Skin: Negative for poor wound healing and rash.   Musculoskeletal: Negative for back pain and falls.   Gastrointestinal: Negative for abdominal pain.   Genitourinary: Negative for nocturia.   Neurological: Positive for dizziness. Negative for focal weakness, headaches, light-headedness and loss of balance.   Psychiatric/Behavioral: Negative for altered mental status and substance abuse.        Stopped alcohol 2 months ago       Current Outpatient Medications on File Prior to Visit   Medication Sig    albuterol (PROVENTIL/VENTOLIN HFA) 90 mcg/actuation inhaler Inhale 2 puffs into the lungs every 6 (six) hours as needed for Wheezing.    amLODIPine (NORVASC) 5 MG tablet Take 1 tablet (5 mg total) by mouth once daily.    cyanocobalamin (VITAMIN B-12) 100 MCG tablet Take 100 mcg by mouth once daily.     No current facility-administered medications on file prior to visit.        Objective:   Objective:  Wt Readings from Last 3 Encounters:   11/18/20 51.5 kg (113 lb 8.6 oz)   11/12/20 53.2 kg (117 lb 4.6 oz)   09/19/20 50.1 kg (110 lb 7.2 oz)     Temp Readings from Last 3 Encounters:   11/12/20 98.8 °F (37.1 °C) (Temporal)   09/19/20 97.4 °F (36.3 °C) (Oral)   04/20/20 97.5 °F (36.4 °C) (Axillary)     BP Readings from Last 3 Encounters:   11/18/20 (!) 166/82   11/12/20 (!) 162/92   09/19/20 (!) 161/87     Pulse Readings from Last 3 Encounters:   11/18/20 71   11/12/20 85   09/19/20 (!) 59       Physical Exam   Constitutional: He is oriented to person, place, and time. He appears well-developed and well-nourished.   HENT:   Head:  Normocephalic and atraumatic.   Eyes: Conjunctivae are normal. No scleral icterus.   Neck: Normal range of motion. Neck supple.   Cardiovascular: Normal rate, regular rhythm, normal heart sounds and intact distal pulses.   No murmur heard.  Pulmonary/Chest: No respiratory distress. He has no wheezes. He has no rales. He exhibits no tenderness.   Abdominal: Soft. Bowel sounds are normal. He exhibits no distension. There is no guarding.   Musculoskeletal: Normal range of motion.   Neurological: He is alert and oriented to person, place, and time.   Skin: Skin is warm.   Psychiatric: He has a normal mood and affect.   Vitals reviewed.      Lab Results   Component Value Date    CHOL 116 (L) 11/12/2020     Lab Results   Component Value Date    HDL 44 11/12/2020     Lab Results   Component Value Date    LDLCALC 56.8 (L) 11/12/2020     Lab Results   Component Value Date    TRIG 76 11/12/2020     Lab Results   Component Value Date    CHOLHDL 37.9 11/12/2020       Chemistry        Component Value Date/Time     11/12/2020 1510    K 5.6 (H) 11/12/2020 1510     (H) 11/12/2020 1510    CO2 18 (L) 11/12/2020 1510    BUN 24 (H) 11/12/2020 1510    CREATININE 1.2 11/12/2020 1510    GLU 71 11/12/2020 1510        Component Value Date/Time    CALCIUM 9.2 11/12/2020 1510    ALKPHOS 61 11/12/2020 1510    AST 13 11/12/2020 1510    ALT 12 11/12/2020 1510    BILITOT 0.2 11/12/2020 1510    ESTGFRAFRICA >60.0 11/12/2020 1510    EGFRNONAA 59.6 (A) 11/12/2020 1510          Lab Results   Component Value Date    TSH 3.075 04/20/2020     Lab Results   Component Value Date    INR 0.9 09/15/2020     Lab Results   Component Value Date    WBC 7.83 11/12/2020    HGB 9.1 (L) 11/12/2020    HCT 28.8 (L) 11/12/2020    MCV 97 11/12/2020     11/12/2020     BNP  @LABRCNTIP(BNP,BNPTRIAGEBLO)@  Estimated Creatinine Clearance: 39.9 mL/min (based on SCr of 1.2 mg/dL).     Imaging:  ======  No results found for this or any previous visit.  No  results found for this or any previous visit.  No results found for this or any previous visit.  No results found for this or any previous visit.  No results found for this or any previous visit.  No procedure found.    Diagnostic Results:  ECG: Reviewed    The ASCVD Risk score (Shaynaawa DUMONT Jr., et al., 2013) failed to calculate for the following reasons:    The valid total cholesterol range is 130 to 320 mg/dL    Assessment and Plan:   Essential hypertension  -  cw norvasc 5 mg   -     carvediloL (COREG) 6.25 MG tablet; Take 1 tablet (6.25 mg total) by mouth 2 (two) times daily with meals.  Dispense: 60 tablet; Refill: 11    Shortness of breath  Comments:  possibly 2/2 to uncontrolled HTN, will check an echocardiogram in setting of his type 2 mi recently and hx of alcoholism  Orders:  -     Echo Color Flow Doppler? Yes; Future    Demand ischemia  Comments:  noted on his last admission , trop 0.06, likely 2/2 sepsis, asymptomatic, chest pain free. LDL 56. ASA 81MG     Muscle pain  Comments:  chronic right shoulder spasms  Orders:  -     tiZANidine (ZANAFLEX) 2 MG tablet; Take 1 tablet (2 mg total) by mouth daily as needed.  Dispense: 30 tablet; Refill: 3        Follow up in 6 months , will follow echo results

## 2020-11-18 NOTE — LETTER
November 18, 2020      Noemy Pena NP  [address]  [place] [address]           [facility]  [address]  [place] [address]  Phone: [phone]          Patient: Ty López   MR Number: [id number]   YOB: 1945   Date of Visit: 11/18/2020       Dear Noemy Pena:    Thank you for referring Ty López to me for evaluation. Attached you will find relevant portions of my assessment and plan of care.    If you have questions, please do not hesitate to call me. I look forward to following Ty López along with you.    Sincerely,    Shonda Sullivan MD    Enclosure  CC:  No Recipients    If you would like to receive this communication electronically, please contact [email or web address] or [phone] to request more information on "[employer] Link access.    For providers and/or their staff who would like to refer a patient to [facility], please contact us through our one-stop-shop provider referral line, Hayde Bernstein, at [phone].    If you feel you have received this communication in error or would no longer like to receive these types of communications, please e-mail [email or web address]

## 2020-11-23 PROBLEM — Z72.0 TOBACCO USE: Status: ACTIVE | Noted: 2020-11-23

## 2020-11-23 PROBLEM — I10 ESSENTIAL HYPERTENSION: Status: ACTIVE | Noted: 2020-11-23

## 2020-11-23 PROBLEM — J43.9 PULMONARY EMPHYSEMA: Status: ACTIVE | Noted: 2020-11-23

## 2020-11-23 NOTE — ASSESSMENT & PLAN NOTE
Approx 3ppd. Counseled on importance of smoke cessation in order to improve quality of life, and prevent worsening of comorbidities.

## 2020-11-23 NOTE — ASSESSMENT & PLAN NOTE
BP elevated today. Changing to amlodipine since pt was having leg edema and is only on 10 of lisinopril prior to appt. Will most likely have to increase dose at next visit.

## 2020-12-01 ENCOUNTER — LAB VISIT (OUTPATIENT)
Dept: LAB | Facility: HOSPITAL | Age: 75
End: 2020-12-01
Attending: NURSE PRACTITIONER
Payer: MEDICARE

## 2020-12-01 ENCOUNTER — OFFICE VISIT (OUTPATIENT)
Dept: INTERNAL MEDICINE | Facility: CLINIC | Age: 75
End: 2020-12-01
Payer: MEDICARE

## 2020-12-01 VITALS
HEART RATE: 73 BPM | DIASTOLIC BLOOD PRESSURE: 78 MMHG | TEMPERATURE: 99 F | HEIGHT: 72 IN | WEIGHT: 114.19 LBS | BODY MASS INDEX: 15.47 KG/M2 | SYSTOLIC BLOOD PRESSURE: 132 MMHG | OXYGEN SATURATION: 100 %

## 2020-12-01 DIAGNOSIS — E87.5 HYPERKALEMIA: ICD-10-CM

## 2020-12-01 DIAGNOSIS — J43.9 PULMONARY EMPHYSEMA, UNSPECIFIED EMPHYSEMA TYPE: ICD-10-CM

## 2020-12-01 DIAGNOSIS — Z72.0 TOBACCO USE: ICD-10-CM

## 2020-12-01 DIAGNOSIS — I10 ESSENTIAL HYPERTENSION: Primary | ICD-10-CM

## 2020-12-01 DIAGNOSIS — M79.10 MUSCLE PAIN: ICD-10-CM

## 2020-12-01 DIAGNOSIS — E43 SEVERE MALNUTRITION: ICD-10-CM

## 2020-12-01 LAB
ALBUMIN SERPL BCP-MCNC: 3.4 G/DL (ref 3.5–5.2)
ALP SERPL-CCNC: 66 U/L (ref 55–135)
ALT SERPL W/O P-5'-P-CCNC: <5 U/L (ref 10–44)
ANION GAP SERPL CALC-SCNC: 11 MMOL/L (ref 8–16)
AST SERPL-CCNC: 8 U/L (ref 10–40)
BILIRUB SERPL-MCNC: 0.3 MG/DL (ref 0.1–1)
BUN SERPL-MCNC: 14 MG/DL (ref 8–23)
CALCIUM SERPL-MCNC: 9 MG/DL (ref 8.7–10.5)
CHLORIDE SERPL-SCNC: 106 MMOL/L (ref 95–110)
CO2 SERPL-SCNC: 16 MMOL/L (ref 23–29)
CREAT SERPL-MCNC: 0.8 MG/DL (ref 0.5–1.4)
EST. GFR  (AFRICAN AMERICAN): >60 ML/MIN/1.73 M^2
EST. GFR  (NON AFRICAN AMERICAN): >60 ML/MIN/1.73 M^2
GLUCOSE SERPL-MCNC: 88 MG/DL (ref 70–110)
POTASSIUM SERPL-SCNC: 4.2 MMOL/L (ref 3.5–5.1)
PROT SERPL-MCNC: 8.2 G/DL (ref 6–8.4)
SODIUM SERPL-SCNC: 133 MMOL/L (ref 136–145)

## 2020-12-01 PROCEDURE — 99214 OFFICE O/P EST MOD 30 MIN: CPT | Mod: S$GLB,,, | Performed by: NURSE PRACTITIONER

## 2020-12-01 PROCEDURE — 36415 COLL VENOUS BLD VENIPUNCTURE: CPT | Mod: PO

## 2020-12-01 PROCEDURE — 99999 PR PBB SHADOW E&M-EST. PATIENT-LVL IV: CPT | Mod: PBBFAC,,, | Performed by: NURSE PRACTITIONER

## 2020-12-01 PROCEDURE — 99214 OFFICE O/P EST MOD 30 MIN: CPT | Mod: PBBFAC,PO | Performed by: NURSE PRACTITIONER

## 2020-12-01 PROCEDURE — 99999 PR PBB SHADOW E&M-EST. PATIENT-LVL IV: ICD-10-PCS | Mod: PBBFAC,,, | Performed by: NURSE PRACTITIONER

## 2020-12-01 PROCEDURE — 99214 PR OFFICE/OUTPT VISIT, EST, LEVL IV, 30-39 MIN: ICD-10-PCS | Mod: S$GLB,,, | Performed by: NURSE PRACTITIONER

## 2020-12-01 PROCEDURE — 99499 RISK ADDL DX/OHS AUDIT: ICD-10-PCS | Mod: S$GLB,,, | Performed by: NURSE PRACTITIONER

## 2020-12-01 PROCEDURE — 99499 UNLISTED E&M SERVICE: CPT | Mod: S$GLB,,, | Performed by: NURSE PRACTITIONER

## 2020-12-01 PROCEDURE — 80053 COMPREHEN METABOLIC PANEL: CPT | Mod: PO

## 2020-12-01 RX ORDER — METHOCARBAMOL 500 MG/1
500 TABLET, FILM COATED ORAL NIGHTLY PRN
Qty: 40 TABLET | Refills: 0 | Status: SHIPPED | OUTPATIENT
Start: 2020-12-01 | End: 2020-12-11

## 2020-12-01 NOTE — ASSESSMENT & PLAN NOTE
approx 2-3 PPD. Discussed importance of smoke cessation in order to improve overall quality of life, and reduce the risk of worsening comorbidities.

## 2020-12-01 NOTE — PROGRESS NOTES
Subjective:       Patient ID: Ty López is a 75 y.o. male.    Chief Complaint: Hypertension, Anorexia, and Insomnia    Mr. López presents to clinic for HTN follow up.  At last visit patient was started on 5 mg amlodipine.  He has since seen Cardiology, and had Coreg added on to medication regimen also.  He had elevated potassium on last CMP, so will recheck a day.  Patient reports continued problems sleeping due to muscle spasms in bilateral upper arms.  Was started on tizanidine at cardiology appointment without any relief since.      Patient Active Problem List   Diagnosis    Pneumonia of right middle lobe due to infectious organism    LOPEZ (acute kidney injury)    Alcohol abuse    Hyponatremia    Elevated troponin    Severe malnutrition    Oropharyngeal dysphagia    Acute cystitis without hematuria    Essential hypertension    Tobacco use    Pulmonary emphysema    Hyperkalemia       History reviewed. No pertinent family history.  Past Surgical History:   Procedure Laterality Date    ABDOMINAL SURGERY           Current Outpatient Medications:     albuterol (PROVENTIL/VENTOLIN HFA) 90 mcg/actuation inhaler, Inhale 2 puffs into the lungs every 6 (six) hours as needed for Wheezing., Disp: 18 g, Rfl: 11    amLODIPine (NORVASC) 5 MG tablet, Take 1 tablet (5 mg total) by mouth once daily., Disp: 30 tablet, Rfl: 0    carvediloL (COREG) 6.25 MG tablet, Take 1 tablet (6.25 mg total) by mouth 2 (two) times daily with meals., Disp: 60 tablet, Rfl: 11    cyanocobalamin (VITAMIN B-12) 100 MCG tablet, Take 100 mcg by mouth once daily., Disp: , Rfl:     methocarbamoL (ROBAXIN) 500 MG Tab, Take 1 tablet (500 mg total) by mouth nightly as needed., Disp: 40 tablet, Rfl: 0    Review of Systems   Constitutional: Negative for appetite change, chills, fatigue and fever.   Respiratory: Positive for cough and shortness of breath.    Cardiovascular: Negative for chest pain and palpitations.   Gastrointestinal:  Negative for abdominal pain, nausea and vomiting.   Musculoskeletal: Positive for myalgias (bilateral upper arms). Negative for gait problem.   Neurological: Negative for dizziness, syncope, light-headedness and headaches.   Psychiatric/Behavioral: Positive for sleep disturbance. Negative for dysphoric mood. The patient is not nervous/anxious.        Objective:   /78   Pulse 73   Temp 98.6 °F (37 °C) (Temporal)   Ht 6' (1.829 m)   Wt 51.8 kg (114 lb 3.2 oz)   SpO2 100%   BMI 15.49 kg/m²      Physical Exam  Constitutional:       General: He is not in acute distress.     Appearance: He is cachectic. He is not ill-appearing.   HENT:      Head: Normocephalic and atraumatic.   Neck:      Musculoskeletal: Normal range of motion and neck supple.   Cardiovascular:      Rate and Rhythm: Normal rate and regular rhythm.      Pulses: Normal pulses.      Heart sounds: Normal heart sounds. No murmur. No friction rub. No gallop.    Pulmonary:      Effort: Pulmonary effort is normal.      Breath sounds: Decreased breath sounds (diffuse) present.   Musculoskeletal: Normal range of motion.      Right lower leg: No edema.      Left lower leg: No edema.   Skin:     General: Skin is warm and dry.      Coloration: Skin is not pale.      Findings: No erythema.   Neurological:      General: No focal deficit present.      Mental Status: He is alert and oriented to person, place, and time.         Assessment & Plan     Problem List Items Addressed This Visit        Pulmonary    Pulmonary emphysema    Overview     9/16/2020 CT chest: Lungs: Severe emphysematous changes throughout the lungs greatest within the upper lobes, right greater than left.  Nodular infiltrates are seen within the right upper lobe concerning for airspace disease/pneumonia.  Follow-up is recommended to exclude underlying neoplastic process.  Marking Actos is noted.         Current Assessment & Plan     Has never seen pulmonology.  Referral sent.          "Relevant Orders    Ambulatory referral/consult to Pulmonology    Ambulatory referral/consult to Outpatient Case Management       Cardiac/Vascular    Essential hypertension - Primary    Current Assessment & Plan     Blood pressure well controlled today.  Continue current medications.         Relevant Orders    Ambulatory referral/consult to Outpatient Case Management       Renal/    Hyperkalemia    Current Assessment & Plan     Rechecking CMP today.            Endocrine    Severe malnutrition    Relevant Orders    Ambulatory referral/consult to Outpatient Case Management       Other    Tobacco use    Current Assessment & Plan     approx 2-3 PPD. Discussed importance of smoke cessation in order to improve overall quality of life, and reduce the risk of worsening comorbidities.         Relevant Orders    Ambulatory referral/consult to Pulmonology      Other Visit Diagnoses     Muscle pain        Relevant Medications    methocarbamoL (ROBAXIN) 500 MG Tab        Follow up in about 3 months (around 3/1/2021) for to establish care with Dr. Lee .            Portions of this note may have been created with voice recognition software. Occasional "wrong-word" or "sound-a-like" substitutions may have occurred due to the inherent limitations of voice recognition software. Please, read the note carefully and recognize, using context, where substitutions have occurred.       "

## 2020-12-10 ENCOUNTER — OUTPATIENT CASE MANAGEMENT (OUTPATIENT)
Dept: ADMINISTRATIVE | Facility: OTHER | Age: 75
End: 2020-12-10

## 2020-12-10 NOTE — PROGRESS NOTES
Attempt #:  1  This LMSW attempted to  Complete SW assessment with patient . Patient reports not being available currently. Patient asked that SW follow up on tomorrow 12/11/2020

## 2020-12-11 NOTE — PROGRESS NOTES
Outpatient Care Management   - Low Risk Patient Assessment    Patient: Ty López  MRN:  [id number]  Date of Service:  12/11/2020  Completed by:  Enedina Weems LMSW  Referral Date: 12/01/2020  Program: OPCM Low Risk    Reason for Visit   Patient presents with    OPCM SW First Assessment Attempt     12/10/2020    Social Work Assessment - Low/Mod Risk     12/11/2020    Case Closure     12/11/2020       Brief Summary: SW completed assessment with patient son Ty López Sr. Caregiver reports patient resides with him. Caregiver denied patient uses any assisted devices to ambulate. Caregiver denied patient needs assistance with food, shelter, medication or medical. Caregiver [facility] Caregiver denied information on Advance Care Planning. Caregiver reports he is patient health care proxy. Caregiver denied any symptoms currently. Caregiver reports no needs. LMSW will close case as no needs.     Patient Summary     OPCM Social Work Assessment (Low/Moderate Risk)    General  Level of Caregiver support: Member independent and does not need caregiver assistance  Have you had to make a decision between paying for any of the following in the last 2 months?: None  Transportation means: Friend  Employment status: Disabled, Retired and not working  Do you have any of the following?: Healthcare proxy  Current symptoms: None  Assessments  Was the PHQ Depression Screening completed this visit?: No  Was the DEBORA-7 Screening completed this visit?: No         Complex Care Plan     Care plan was discussed and completed today with input from patient and/or caregiver.    Patient Instructions     No follow-ups on file.    [facility] OPCM Self-Management Care Plan was developed with the patients/caregivers input and was based on identified barriers from todays assessment.  Goals were written today with the patient/caregiver and the patient has agreed to work  towards these goals to improve his/her overall well-being. Patient verbalized understanding of the care plan, goals, and all of today's instructions. Encouraged patient/caregiver to communicate with his/her physician and health care team about health conditions and the treatment plan.  Provided my contact information today and encouraged patient/caregiver to call me with any questions as needed.

## 2020-12-18 ENCOUNTER — HOSPITAL ENCOUNTER (OUTPATIENT)
Dept: CARDIOLOGY | Facility: HOSPITAL | Age: 75
Discharge: HOME | End: 2020-12-18
Attending: INTERNAL MEDICINE
Payer: MEDICARE

## 2020-12-18 ENCOUNTER — DOCUMENT SCAN (OUTPATIENT)
Dept: HOME HEALTH SERVICES | Facility: HOSPITAL | Age: 75
End: 2020-12-18

## 2020-12-18 ENCOUNTER — DOCUMENT SCAN (OUTPATIENT)
Dept: HOME HEALTH SERVICES | Facility: HOSPITAL | Age: 75
End: 2020-12-18
Payer: MEDICARE

## 2020-12-18 VITALS — BODY MASS INDEX: 15.44 KG/M2 | HEIGHT: 72 IN | WEIGHT: 114 LBS

## 2020-12-18 DIAGNOSIS — R06.02 SHORTNESS OF BREATH: ICD-10-CM

## 2020-12-18 LAB
AORTIC ROOT ANNULUS: 3.5 CM
AV INDEX (PROSTH): 0.78
AV MEAN GRADIENT: 3 MMHG
AV PEAK GRADIENT: 5 MMHG
AV VALVE AREA: 2.37 CM2
AV VELOCITY RATIO: 0.77
BSA FOR ECHO PROCEDURE: 1.62 M2
CV ECHO LV RWT: 0.59 CM
DOP CALC AO PEAK VEL: 1.09 M/S
DOP CALC AO VTI: 26.36 CM
DOP CALC LVOT AREA: 3 CM2
DOP CALC LVOT DIAMETER: 1.97 CM
DOP CALC LVOT PEAK VEL: 0.84 M/S
DOP CALC LVOT STROKE VOLUME: 62.58 CM3
DOP CALC RVOT PEAK VEL: 0.66 M/S
DOP CALC RVOT VTI: 17.84 CM
DOP CALCLVOT PEAK VEL VTI: 20.54 CM
E WAVE DECELERATION TIME: 240.63 MSEC
E/A RATIO: 1.04
E/E' RATIO: 10.25 M/S
ECHO LV POSTERIOR WALL: 1.34 CM (ref 0.6–1.1)
FRACTIONAL SHORTENING: 32 % (ref 28–44)
INTERVENTRICULAR SEPTUM: 1.45 CM (ref 0.6–1.1)
IVRT: 107.27 MSEC
LA MAJOR: 4.26 CM
LA MINOR: 4.46 CM
LA WIDTH: 4.31 CM
LEFT ATRIUM SIZE: 3.39 CM
LEFT ATRIUM VOLUME INDEX: 32.3 ML/M2
LEFT ATRIUM VOLUME: 54.12 CM3
LEFT INTERNAL DIMENSION IN SYSTOLE: 3.13 CM (ref 2.1–4)
LEFT VENTRICLE DIASTOLIC VOLUME INDEX: 57.41 ML/M2
LEFT VENTRICLE DIASTOLIC VOLUME: 96.32 ML
LEFT VENTRICLE MASS INDEX: 151 G/M2
LEFT VENTRICLE SYSTOLIC VOLUME INDEX: 23.1 ML/M2
LEFT VENTRICLE SYSTOLIC VOLUME: 38.68 ML
LEFT VENTRICULAR INTERNAL DIMENSION IN DIASTOLE: 4.58 CM (ref 3.5–6)
LEFT VENTRICULAR MASS: 253.73 G
LV LATERAL E/E' RATIO: 8.2 M/S
LV SEPTAL E/E' RATIO: 13.67 M/S
MV PEAK A VEL: 0.79 M/S
MV PEAK E VEL: 0.82 M/S
PISA TR MAX VEL: 2.68 M/S
PULM VEIN S/D RATIO: 1.63
PV MEAN GRADIENT: 1 MMHG
PV PEAK D VEL: 0.43 M/S
PV PEAK S VEL: 0.7 M/S
RA MAJOR: 4.56 CM
RA WIDTH: 3.86 CM
RIGHT VENTRICULAR END-DIASTOLIC DIMENSION: 3.63 CM
SINUS: 3.37 CM
STJ: 3.56 CM
TDI LATERAL: 0.1 M/S
TDI SEPTAL: 0.06 M/S
TDI: 0.08 M/S
TR MAX PG: 29 MMHG
TRICUSPID ANNULAR PLANE SYSTOLIC EXCURSION: 3.05 CM

## 2020-12-18 PROCEDURE — 93306 ECHO (CUPID ONLY): ICD-10-PCS | Mod: 26,,, | Performed by: INTERNAL MEDICINE

## 2020-12-18 PROCEDURE — 93306 TTE W/DOPPLER COMPLETE: CPT | Mod: PO

## 2020-12-18 PROCEDURE — 93306 TTE W/DOPPLER COMPLETE: CPT | Mod: 26,,, | Performed by: INTERNAL MEDICINE

## 2021-02-25 ENCOUNTER — HOSPITAL ENCOUNTER (OUTPATIENT)
Dept: RADIOLOGY | Facility: HOSPITAL | Age: 76
Discharge: HOME | End: 2021-02-25
Attending: NURSE PRACTITIONER
Payer: MEDICARE

## 2021-02-25 DIAGNOSIS — R63.0 ANOREXIA: ICD-10-CM

## 2021-02-25 DIAGNOSIS — R19.7 DIARRHEA, UNSPECIFIED: Primary | ICD-10-CM

## 2021-02-25 DIAGNOSIS — R19.7 DIARRHEA, UNSPECIFIED: ICD-10-CM

## 2021-02-25 PROCEDURE — 74018 RADEX ABDOMEN 1 VIEW: CPT | Mod: 26,,, | Performed by: RADIOLOGY

## 2021-02-25 PROCEDURE — 74018 RADEX ABDOMEN 1 VIEW: CPT | Mod: TC,PO

## 2021-02-25 PROCEDURE — 74018 XR ABDOMEN AP 1 VIEW: ICD-10-PCS | Mod: 26,,, | Performed by: RADIOLOGY

## 2021-03-10 DIAGNOSIS — J18.9 PNEUMONIA OF RIGHT MIDDLE LOBE DUE TO INFECTIOUS ORGANISM: Primary | ICD-10-CM

## 2021-03-11 ENCOUNTER — TELEPHONE (OUTPATIENT)
Dept: PULMONOLOGY | Facility: CLINIC | Age: 76
End: 2021-03-11

## 2021-03-11 ENCOUNTER — PATIENT OUTREACH (OUTPATIENT)
Dept: ADMINISTRATIVE | Facility: OTHER | Age: 76
End: 2021-03-11

## 2021-05-25 ENCOUNTER — PATIENT OUTREACH (OUTPATIENT)
Dept: ADMINISTRATIVE | Facility: OTHER | Age: 76
End: 2021-05-25

## 2021-06-09 ENCOUNTER — OFFICE VISIT (OUTPATIENT)
Dept: CARDIOLOGY | Facility: CLINIC | Age: 76
End: 2021-06-09
Payer: MEDICARE

## 2021-06-09 VITALS
HEART RATE: 62 BPM | BODY MASS INDEX: 14.77 KG/M2 | DIASTOLIC BLOOD PRESSURE: 86 MMHG | WEIGHT: 108.94 LBS | SYSTOLIC BLOOD PRESSURE: 170 MMHG | OXYGEN SATURATION: 95 %

## 2021-06-09 DIAGNOSIS — I10 ESSENTIAL HYPERTENSION: ICD-10-CM

## 2021-06-09 DIAGNOSIS — R05.9 COUGH: Primary | ICD-10-CM

## 2021-06-09 DIAGNOSIS — J18.9 PNEUMONIA DUE TO INFECTIOUS ORGANISM, UNSPECIFIED LATERALITY, UNSPECIFIED PART OF LUNG: ICD-10-CM

## 2021-06-09 DIAGNOSIS — R06.02 SHORTNESS OF BREATH: ICD-10-CM

## 2021-06-09 PROCEDURE — 99999 PR PBB SHADOW E&M-EST. PATIENT-LVL III: ICD-10-PCS | Mod: PBBFAC,,, | Performed by: INTERNAL MEDICINE

## 2021-06-09 PROCEDURE — 99499 UNLISTED E&M SERVICE: CPT | Mod: S$GLB,,, | Performed by: INTERNAL MEDICINE

## 2021-06-09 PROCEDURE — 99213 OFFICE O/P EST LOW 20 MIN: CPT | Mod: S$GLB,,, | Performed by: INTERNAL MEDICINE

## 2021-06-09 PROCEDURE — 99499 RISK ADDL DX/OHS AUDIT: ICD-10-PCS | Mod: S$GLB,,, | Performed by: INTERNAL MEDICINE

## 2021-06-09 PROCEDURE — 99999 PR PBB SHADOW E&M-EST. PATIENT-LVL III: CPT | Mod: PBBFAC,,, | Performed by: INTERNAL MEDICINE

## 2021-06-09 PROCEDURE — 99213 OFFICE O/P EST LOW 20 MIN: CPT | Mod: PBBFAC,PO | Performed by: INTERNAL MEDICINE

## 2021-06-09 PROCEDURE — 99213 PR OFFICE/OUTPT VISIT, EST, LEVL III, 20-29 MIN: ICD-10-PCS | Mod: S$GLB,,, | Performed by: INTERNAL MEDICINE

## 2021-06-09 RX ORDER — AMLODIPINE BESYLATE 10 MG/1
10 TABLET ORAL DAILY
Qty: 90 TABLET | Refills: 3 | Status: SHIPPED | OUTPATIENT
Start: 2021-06-09 | End: 2021-09-07

## 2021-06-09 RX ORDER — LISINOPRIL 20 MG/1
20 TABLET ORAL DAILY
Qty: 90 TABLET | Refills: 3 | Status: SHIPPED | OUTPATIENT
Start: 2021-06-09 | End: 2022-06-09

## 2021-06-09 RX ORDER — LISINOPRIL 10 MG/1
TABLET ORAL
COMMUNITY
Start: 2021-05-11 | End: 2021-06-09

## 2021-08-21 ENCOUNTER — TELEPHONE (OUTPATIENT)
Dept: HEPATOLOGY | Facility: HOSPITAL | Age: 76
End: 2021-08-21

## 2021-08-21 ENCOUNTER — HOSPITAL ENCOUNTER (OUTPATIENT)
Facility: HOSPITAL | Age: 76
Discharge: HOME | End: 2021-08-23
Attending: EMERGENCY MEDICINE | Admitting: INTERNAL MEDICINE
Payer: MEDICARE

## 2021-08-21 DIAGNOSIS — H53.9 VISION CHANGES: ICD-10-CM

## 2021-08-21 DIAGNOSIS — R91.8 MASS OF RIGHT LUNG: ICD-10-CM

## 2021-08-21 DIAGNOSIS — R07.9 CHEST PAIN: ICD-10-CM

## 2021-08-21 DIAGNOSIS — R42 DIZZINESS: ICD-10-CM

## 2021-08-21 DIAGNOSIS — N39.0 URINARY TRACT INFECTION WITHOUT HEMATURIA, SITE UNSPECIFIED: ICD-10-CM

## 2021-08-21 DIAGNOSIS — R91.8 RIGHT UPPER LOBE PULMONARY INFILTRATE: Primary | ICD-10-CM

## 2021-08-21 LAB
ALBUMIN SERPL BCP-MCNC: 2.6 G/DL (ref 3.5–5.2)
ALP SERPL-CCNC: 112 U/L (ref 55–135)
ALT SERPL W/O P-5'-P-CCNC: 14 U/L (ref 10–44)
ANION GAP SERPL CALC-SCNC: 12 MMOL/L (ref 8–16)
AST SERPL-CCNC: 24 U/L (ref 10–40)
BACTERIA #/AREA URNS AUTO: ABNORMAL /HPF
BASOPHILS # BLD AUTO: 0.01 K/UL (ref 0–0.2)
BASOPHILS NFR BLD: 0.1 % (ref 0–1.9)
BILIRUB SERPL-MCNC: 0.6 MG/DL (ref 0.1–1)
BILIRUB UR QL STRIP: ABNORMAL
BNP SERPL-MCNC: 62 PG/ML (ref 0–99)
BUN SERPL-MCNC: 16 MG/DL (ref 8–23)
CALCIUM SERPL-MCNC: 8.8 MG/DL (ref 8.7–10.5)
CHLORIDE SERPL-SCNC: 102 MMOL/L (ref 95–110)
CLARITY UR REFRACT.AUTO: ABNORMAL
CO2 SERPL-SCNC: 20 MMOL/L (ref 23–29)
COLOR UR AUTO: YELLOW
CREAT SERPL-MCNC: 0.7 MG/DL (ref 0.5–1.4)
CTP QC/QA: YES
D DIMER PPP IA.FEU-MCNC: 2.48 MG/L FEU
DIFFERENTIAL METHOD: ABNORMAL
EOSINOPHIL # BLD AUTO: 0 K/UL (ref 0–0.5)
EOSINOPHIL NFR BLD: 0.4 % (ref 0–8)
ERYTHROCYTE [DISTWIDTH] IN BLOOD BY AUTOMATED COUNT: 18.3 % (ref 11.5–14.5)
EST. GFR  (AFRICAN AMERICAN): >60 ML/MIN/1.73 M^2
EST. GFR  (NON AFRICAN AMERICAN): >60 ML/MIN/1.73 M^2
GIANT PLATELETS BLD QL SMEAR: ABNORMAL
GLUCOSE SERPL-MCNC: 102 MG/DL (ref 70–110)
GLUCOSE UR QL STRIP: NEGATIVE
HCT VFR BLD AUTO: 26.3 % (ref 40–54)
HGB BLD-MCNC: 8.7 G/DL (ref 14–18)
HGB UR QL STRIP: NEGATIVE
HYPOCHROMIA BLD QL SMEAR: ABNORMAL
IMM GRANULOCYTES # BLD AUTO: 0.03 K/UL (ref 0–0.04)
IMM GRANULOCYTES NFR BLD AUTO: 0.4 % (ref 0–0.5)
KETONES UR QL STRIP: ABNORMAL
LEUKOCYTE ESTERASE UR QL STRIP: ABNORMAL
LYMPHOCYTES # BLD AUTO: 1.2 K/UL (ref 1–4.8)
LYMPHOCYTES NFR BLD: 16.7 % (ref 18–48)
MCH RBC QN AUTO: 33.6 PG (ref 27–31)
MCHC RBC AUTO-ENTMCNC: 33.1 G/DL (ref 32–36)
MCV RBC AUTO: 102 FL (ref 82–98)
MICROSCOPIC COMMENT: ABNORMAL
MONOCYTES # BLD AUTO: 0.8 K/UL (ref 0.3–1)
MONOCYTES NFR BLD: 11 % (ref 4–15)
NEUTROPHILS # BLD AUTO: 5 K/UL (ref 1.8–7.7)
NEUTROPHILS NFR BLD: 71.4 % (ref 38–73)
NITRITE UR QL STRIP: POSITIVE
NRBC BLD-RTO: 0 /100 WBC
PH UR STRIP: 6 [PH] (ref 5–8)
PLATELET # BLD AUTO: 117 K/UL (ref 150–450)
PLATELET BLD QL SMEAR: ABNORMAL
PMV BLD AUTO: 10.7 FL (ref 9.2–12.9)
POTASSIUM SERPL-SCNC: 4.1 MMOL/L (ref 3.5–5.1)
PROT SERPL-MCNC: 7.3 G/DL (ref 6–8.4)
PROT UR QL STRIP: NEGATIVE
RBC # BLD AUTO: 2.59 M/UL (ref 4.6–6.2)
SARS-COV-2 RDRP RESP QL NAA+PROBE: NEGATIVE
SODIUM SERPL-SCNC: 134 MMOL/L (ref 136–145)
SP GR UR STRIP: 1.02 (ref 1–1.03)
SQUAMOUS #/AREA URNS AUTO: 2 /HPF
TROPONIN I SERPL DL<=0.01 NG/ML-MCNC: <0.006 NG/ML (ref 0–0.03)
URN SPEC COLLECT METH UR: ABNORMAL
UROBILINOGEN UR STRIP-ACNC: NEGATIVE EU/DL
WBC # BLD AUTO: 7.02 K/UL (ref 3.9–12.7)
WBC #/AREA URNS AUTO: 12 /HPF (ref 0–5)

## 2021-08-21 PROCEDURE — 87186 SC STD MICRODIL/AGAR DIL: CPT | Performed by: EMERGENCY MEDICINE

## 2021-08-21 PROCEDURE — 93010 ELECTROCARDIOGRAM REPORT: CPT | Mod: ,,, | Performed by: INTERNAL MEDICINE

## 2021-08-21 PROCEDURE — 93010 EKG 12-LEAD: ICD-10-PCS | Mod: ,,, | Performed by: INTERNAL MEDICINE

## 2021-08-21 PROCEDURE — 93005 ELECTROCARDIOGRAM TRACING: CPT | Mod: ER

## 2021-08-21 PROCEDURE — 85379 FIBRIN DEGRADATION QUANT: CPT | Mod: ER | Performed by: EMERGENCY MEDICINE

## 2021-08-21 PROCEDURE — 87088 URINE BACTERIA CULTURE: CPT | Performed by: EMERGENCY MEDICINE

## 2021-08-21 PROCEDURE — 83880 ASSAY OF NATRIURETIC PEPTIDE: CPT | Mod: ER | Performed by: EMERGENCY MEDICINE

## 2021-08-21 PROCEDURE — 25000003 PHARM REV CODE 250: Mod: ER | Performed by: EMERGENCY MEDICINE

## 2021-08-21 PROCEDURE — 87077 CULTURE AEROBIC IDENTIFY: CPT | Performed by: EMERGENCY MEDICINE

## 2021-08-21 PROCEDURE — 81000 URINALYSIS NONAUTO W/SCOPE: CPT | Mod: ER | Performed by: EMERGENCY MEDICINE

## 2021-08-21 PROCEDURE — 87086 URINE CULTURE/COLONY COUNT: CPT | Performed by: EMERGENCY MEDICINE

## 2021-08-21 PROCEDURE — U0002 COVID-19 LAB TEST NON-CDC: HCPCS | Mod: ER | Performed by: EMERGENCY MEDICINE

## 2021-08-21 PROCEDURE — 96361 HYDRATE IV INFUSION ADD-ON: CPT | Mod: ER

## 2021-08-21 PROCEDURE — 85025 COMPLETE CBC W/AUTO DIFF WBC: CPT | Mod: ER | Performed by: EMERGENCY MEDICINE

## 2021-08-21 PROCEDURE — 99285 EMERGENCY DEPT VISIT HI MDM: CPT | Mod: 25,ER

## 2021-08-21 PROCEDURE — 84484 ASSAY OF TROPONIN QUANT: CPT | Mod: ER | Performed by: EMERGENCY MEDICINE

## 2021-08-21 PROCEDURE — 80053 COMPREHEN METABOLIC PANEL: CPT | Mod: ER | Performed by: EMERGENCY MEDICINE

## 2021-08-21 PROCEDURE — 63600175 PHARM REV CODE 636 W HCPCS: Mod: ER | Performed by: EMERGENCY MEDICINE

## 2021-08-21 PROCEDURE — 25500020 PHARM REV CODE 255: Mod: ER | Performed by: EMERGENCY MEDICINE

## 2021-08-21 RX ORDER — DOXYCYCLINE HYCLATE 100 MG
100 TABLET ORAL
Status: COMPLETED | OUTPATIENT
Start: 2021-08-21 | End: 2021-08-21

## 2021-08-21 RX ORDER — DOXYCYCLINE 100 MG/1
100 CAPSULE ORAL 2 TIMES DAILY
Qty: 20 CAPSULE | Refills: 0 | Status: SHIPPED | OUTPATIENT
Start: 2021-08-21 | End: 2021-08-23 | Stop reason: HOSPADM

## 2021-08-21 RX ORDER — ACETAMINOPHEN 325 MG/1
650 TABLET ORAL
Status: DISCONTINUED | OUTPATIENT
Start: 2021-08-21 | End: 2021-08-21

## 2021-08-21 RX ADMIN — SODIUM CHLORIDE, SODIUM LACTATE, POTASSIUM CHLORIDE, AND CALCIUM CHLORIDE 1000 ML: .6; .31; .03; .02 INJECTION, SOLUTION INTRAVENOUS at 01:08

## 2021-08-21 RX ADMIN — IOHEXOL 75 ML: 350 INJECTION, SOLUTION INTRAVENOUS at 03:08

## 2021-08-21 RX ADMIN — DOXYCYCLINE HYCLATE 100 MG: 100 TABLET, COATED ORAL at 04:08

## 2021-08-22 PROBLEM — R91.8 HILAR MASS: Status: ACTIVE | Noted: 2021-08-22

## 2021-08-22 PROBLEM — D69.6 THROMBOCYTOPENIA: Status: ACTIVE | Noted: 2021-08-22

## 2021-08-22 PROBLEM — D64.9 ANEMIA: Status: ACTIVE | Noted: 2021-08-22

## 2021-08-22 PROCEDURE — G0378 HOSPITAL OBSERVATION PER HR: HCPCS | Mod: ER

## 2021-08-22 PROCEDURE — 99214 PR OFFICE/OUTPT VISIT, EST, LEVL IV, 30-39 MIN: ICD-10-PCS | Mod: ,,, | Performed by: INTERNAL MEDICINE

## 2021-08-22 PROCEDURE — 63600175 PHARM REV CODE 636 W HCPCS: Performed by: NURSE PRACTITIONER

## 2021-08-22 PROCEDURE — 25000003 PHARM REV CODE 250: Mod: ER | Performed by: EMERGENCY MEDICINE

## 2021-08-22 PROCEDURE — 25000003 PHARM REV CODE 250: Performed by: NURSE PRACTITIONER

## 2021-08-22 PROCEDURE — G0378 HOSPITAL OBSERVATION PER HR: HCPCS

## 2021-08-22 PROCEDURE — 25500020 PHARM REV CODE 255: Performed by: INTERNAL MEDICINE

## 2021-08-22 PROCEDURE — 25500020 PHARM REV CODE 255: Performed by: NURSE PRACTITIONER

## 2021-08-22 PROCEDURE — A9585 GADOBUTROL INJECTION: HCPCS | Performed by: INTERNAL MEDICINE

## 2021-08-22 PROCEDURE — 96374 THER/PROPH/DIAG INJ IV PUSH: CPT | Mod: 59 | Performed by: EMERGENCY MEDICINE

## 2021-08-22 PROCEDURE — 99214 OFFICE O/P EST MOD 30 MIN: CPT | Mod: ,,, | Performed by: INTERNAL MEDICINE

## 2021-08-22 PROCEDURE — A4216 STERILE WATER/SALINE, 10 ML: HCPCS | Performed by: NURSE PRACTITIONER

## 2021-08-22 RX ORDER — GADOBUTROL 604.72 MG/ML
10 INJECTION INTRAVENOUS
Status: COMPLETED | OUTPATIENT
Start: 2021-08-22 | End: 2021-08-22

## 2021-08-22 RX ORDER — SODIUM CHLORIDE 0.9 % (FLUSH) 0.9 %
10 SYRINGE (ML) INJECTION EVERY 8 HOURS
Status: DISCONTINUED | OUTPATIENT
Start: 2021-08-22 | End: 2021-08-23 | Stop reason: HOSPADM

## 2021-08-22 RX ORDER — AMOXICILLIN 250 MG
1 CAPSULE ORAL 2 TIMES DAILY
Status: DISCONTINUED | OUTPATIENT
Start: 2021-08-22 | End: 2021-08-23 | Stop reason: HOSPADM

## 2021-08-22 RX ORDER — HYDRALAZINE HYDROCHLORIDE 20 MG/ML
10 INJECTION INTRAMUSCULAR; INTRAVENOUS EVERY 6 HOURS PRN
Status: DISCONTINUED | OUTPATIENT
Start: 2021-08-22 | End: 2021-08-23 | Stop reason: HOSPADM

## 2021-08-22 RX ORDER — IBUPROFEN 200 MG
24 TABLET ORAL
Status: DISCONTINUED | OUTPATIENT
Start: 2021-08-22 | End: 2021-08-23 | Stop reason: HOSPADM

## 2021-08-22 RX ORDER — SODIUM CHLORIDE 0.9 % (FLUSH) 0.9 %
10 SYRINGE (ML) INJECTION
Status: DISCONTINUED | OUTPATIENT
Start: 2021-08-22 | End: 2021-08-23 | Stop reason: HOSPADM

## 2021-08-22 RX ORDER — ACETAMINOPHEN 325 MG/1
650 TABLET ORAL EVERY 4 HOURS PRN
Status: DISCONTINUED | OUTPATIENT
Start: 2021-08-22 | End: 2021-08-23 | Stop reason: HOSPADM

## 2021-08-22 RX ORDER — TALC
6 POWDER (GRAM) TOPICAL NIGHTLY PRN
Status: DISCONTINUED | OUTPATIENT
Start: 2021-08-22 | End: 2021-08-23 | Stop reason: HOSPADM

## 2021-08-22 RX ORDER — GLUCAGON 1 MG
1 KIT INJECTION
Status: DISCONTINUED | OUTPATIENT
Start: 2021-08-22 | End: 2021-08-23 | Stop reason: HOSPADM

## 2021-08-22 RX ORDER — IPRATROPIUM BROMIDE AND ALBUTEROL SULFATE 2.5; .5 MG/3ML; MG/3ML
3 SOLUTION RESPIRATORY (INHALATION) EVERY 4 HOURS PRN
Status: DISCONTINUED | OUTPATIENT
Start: 2021-08-22 | End: 2021-08-23 | Stop reason: HOSPADM

## 2021-08-22 RX ORDER — IBUPROFEN 200 MG
16 TABLET ORAL
Status: DISCONTINUED | OUTPATIENT
Start: 2021-08-22 | End: 2021-08-23 | Stop reason: HOSPADM

## 2021-08-22 RX ORDER — ONDANSETRON 2 MG/ML
4 INJECTION INTRAMUSCULAR; INTRAVENOUS EVERY 8 HOURS PRN
Status: DISCONTINUED | OUTPATIENT
Start: 2021-08-22 | End: 2021-08-23 | Stop reason: HOSPADM

## 2021-08-22 RX ORDER — DOXYCYCLINE HYCLATE 100 MG
100 TABLET ORAL
Status: COMPLETED | OUTPATIENT
Start: 2021-08-22 | End: 2021-08-22

## 2021-08-22 RX ADMIN — DOXYCYCLINE HYCLATE 100 MG: 100 TABLET, COATED ORAL at 06:08

## 2021-08-22 RX ADMIN — SENNOSIDES AND DOCUSATE SODIUM 1 TABLET: 8.6; 5 TABLET ORAL at 08:08

## 2021-08-22 RX ADMIN — Medication 10 ML: at 10:08

## 2021-08-22 RX ADMIN — IOHEXOL 75 ML: 350 INJECTION, SOLUTION INTRAVENOUS at 06:08

## 2021-08-22 RX ADMIN — IOHEXOL 30 ML: 350 INJECTION, SOLUTION INTRAVENOUS at 05:08

## 2021-08-22 RX ADMIN — GADOBUTROL 4 ML: 604.72 INJECTION INTRAVENOUS at 02:08

## 2021-08-22 RX ADMIN — CEFTRIAXONE 1 G: 1 INJECTION, SOLUTION INTRAVENOUS at 05:08

## 2021-08-23 ENCOUNTER — TELEPHONE (OUTPATIENT)
Dept: HEPATOLOGY | Facility: HOSPITAL | Age: 76
End: 2021-08-23

## 2021-08-23 VITALS
BODY MASS INDEX: 14.04 KG/M2 | OXYGEN SATURATION: 99 % | SYSTOLIC BLOOD PRESSURE: 123 MMHG | RESPIRATION RATE: 18 BRPM | HEART RATE: 79 BPM | HEIGHT: 72 IN | DIASTOLIC BLOOD PRESSURE: 75 MMHG | TEMPERATURE: 98 F | WEIGHT: 103.63 LBS

## 2021-08-23 LAB
ALBUMIN SERPL BCP-MCNC: 2.2 G/DL (ref 3.5–5.2)
ALP SERPL-CCNC: 87 U/L (ref 55–135)
ALT SERPL W/O P-5'-P-CCNC: 13 U/L (ref 10–44)
ANION GAP SERPL CALC-SCNC: 10 MMOL/L (ref 8–16)
AST SERPL-CCNC: 30 U/L (ref 10–40)
BASOPHILS # BLD AUTO: 0.02 K/UL (ref 0–0.2)
BASOPHILS NFR BLD: 0.5 % (ref 0–1.9)
BILIRUB SERPL-MCNC: 0.3 MG/DL (ref 0.1–1)
BUN SERPL-MCNC: 9 MG/DL (ref 8–23)
CALCIUM SERPL-MCNC: 8.1 MG/DL (ref 8.7–10.5)
CHLORIDE SERPL-SCNC: 103 MMOL/L (ref 95–110)
CO2 SERPL-SCNC: 21 MMOL/L (ref 23–29)
CREAT SERPL-MCNC: 0.6 MG/DL (ref 0.5–1.4)
CRP SERPL-MCNC: 17.1 MG/L (ref 0–8.2)
DIFFERENTIAL METHOD: ABNORMAL
EOSINOPHIL # BLD AUTO: 0 K/UL (ref 0–0.5)
EOSINOPHIL NFR BLD: 0.7 % (ref 0–8)
ERYTHROCYTE [DISTWIDTH] IN BLOOD BY AUTOMATED COUNT: 17.5 % (ref 11.5–14.5)
EST. GFR  (AFRICAN AMERICAN): >60 ML/MIN/1.73 M^2
EST. GFR  (NON AFRICAN AMERICAN): >60 ML/MIN/1.73 M^2
FERRITIN SERPL-MCNC: 408 NG/ML (ref 20–300)
GLUCOSE SERPL-MCNC: 77 MG/DL (ref 70–110)
HCT VFR BLD AUTO: 24.8 % (ref 40–54)
HGB BLD-MCNC: 8 G/DL (ref 14–18)
IMM GRANULOCYTES # BLD AUTO: 0.02 K/UL (ref 0–0.04)
IMM GRANULOCYTES NFR BLD AUTO: 0.5 % (ref 0–0.5)
IRON SERPL-MCNC: 25 UG/DL (ref 45–160)
LYMPHOCYTES # BLD AUTO: 1 K/UL (ref 1–4.8)
LYMPHOCYTES NFR BLD: 22.1 % (ref 18–48)
MAGNESIUM SERPL-MCNC: 1.7 MG/DL (ref 1.6–2.6)
MCH RBC QN AUTO: 32.7 PG (ref 27–31)
MCHC RBC AUTO-ENTMCNC: 32.3 G/DL (ref 32–36)
MCV RBC AUTO: 101 FL (ref 82–98)
MONOCYTES # BLD AUTO: 0.8 K/UL (ref 0.3–1)
MONOCYTES NFR BLD: 18.6 % (ref 4–15)
NEUTROPHILS # BLD AUTO: 2.5 K/UL (ref 1.8–7.7)
NEUTROPHILS NFR BLD: 57.6 % (ref 38–73)
NRBC BLD-RTO: 0 /100 WBC
PHOSPHATE SERPL-MCNC: 3.3 MG/DL (ref 2.7–4.5)
PLATELET # BLD AUTO: 131 K/UL (ref 150–450)
PMV BLD AUTO: 10.7 FL (ref 9.2–12.9)
POTASSIUM SERPL-SCNC: 4 MMOL/L (ref 3.5–5.1)
PROCALCITONIN SERPL IA-MCNC: 0.04 NG/ML
PROT SERPL-MCNC: 6 G/DL (ref 6–8.4)
RBC # BLD AUTO: 2.45 M/UL (ref 4.6–6.2)
SATURATED IRON: 16 % (ref 20–50)
SODIUM SERPL-SCNC: 134 MMOL/L (ref 136–145)
TOTAL IRON BINDING CAPACITY: 152 UG/DL (ref 250–450)
TRANSFERRIN SERPL-MCNC: 103 MG/DL (ref 200–375)
WBC # BLD AUTO: 4.35 K/UL (ref 3.9–12.7)

## 2021-08-23 PROCEDURE — G0378 HOSPITAL OBSERVATION PER HR: HCPCS

## 2021-08-23 PROCEDURE — 97165 OT EVAL LOW COMPLEX 30 MIN: CPT

## 2021-08-23 PROCEDURE — 99214 PR OFFICE/OUTPT VISIT, EST, LEVL IV, 30-39 MIN: ICD-10-PCS | Mod: ,,, | Performed by: INTERNAL MEDICINE

## 2021-08-23 PROCEDURE — 99233 SBSQ HOSP IP/OBS HIGH 50: CPT | Mod: ,,, | Performed by: INTERNAL MEDICINE

## 2021-08-23 PROCEDURE — 97530 THERAPEUTIC ACTIVITIES: CPT | Performed by: PHYSICAL THERAPIST

## 2021-08-23 PROCEDURE — 99233 PR SUBSEQUENT HOSPITAL CARE,LEVL III: ICD-10-PCS | Mod: ,,, | Performed by: INTERNAL MEDICINE

## 2021-08-23 PROCEDURE — 80053 COMPREHEN METABOLIC PANEL: CPT | Performed by: NURSE PRACTITIONER

## 2021-08-23 PROCEDURE — 36415 COLL VENOUS BLD VENIPUNCTURE: CPT | Performed by: NURSE PRACTITIONER

## 2021-08-23 PROCEDURE — 82728 ASSAY OF FERRITIN: CPT | Performed by: NURSE PRACTITIONER

## 2021-08-23 PROCEDURE — A4216 STERILE WATER/SALINE, 10 ML: HCPCS | Performed by: NURSE PRACTITIONER

## 2021-08-23 PROCEDURE — 99214 OFFICE O/P EST MOD 30 MIN: CPT | Mod: ,,, | Performed by: INTERNAL MEDICINE

## 2021-08-23 PROCEDURE — 86140 C-REACTIVE PROTEIN: CPT | Performed by: INTERNAL MEDICINE

## 2021-08-23 PROCEDURE — 94761 N-INVAS EAR/PLS OXIMETRY MLT: CPT

## 2021-08-23 PROCEDURE — 25000003 PHARM REV CODE 250: Performed by: INTERNAL MEDICINE

## 2021-08-23 PROCEDURE — 84466 ASSAY OF TRANSFERRIN: CPT | Performed by: NURSE PRACTITIONER

## 2021-08-23 PROCEDURE — 84145 PROCALCITONIN (PCT): CPT | Performed by: INTERNAL MEDICINE

## 2021-08-23 PROCEDURE — 85025 COMPLETE CBC W/AUTO DIFF WBC: CPT | Performed by: NURSE PRACTITIONER

## 2021-08-23 PROCEDURE — 84100 ASSAY OF PHOSPHORUS: CPT | Performed by: NURSE PRACTITIONER

## 2021-08-23 PROCEDURE — 83735 ASSAY OF MAGNESIUM: CPT | Performed by: NURSE PRACTITIONER

## 2021-08-23 PROCEDURE — 25000003 PHARM REV CODE 250: Performed by: NURSE PRACTITIONER

## 2021-08-23 PROCEDURE — 36415 COLL VENOUS BLD VENIPUNCTURE: CPT | Performed by: INTERNAL MEDICINE

## 2021-08-23 PROCEDURE — 96375 TX/PRO/DX INJ NEW DRUG ADDON: CPT | Performed by: EMERGENCY MEDICINE

## 2021-08-23 PROCEDURE — 97161 PT EVAL LOW COMPLEX 20 MIN: CPT | Performed by: PHYSICAL THERAPIST

## 2021-08-23 RX ORDER — CEFDINIR 300 MG/1
300 CAPSULE ORAL 2 TIMES DAILY
Qty: 20 CAPSULE | Refills: 0 | Status: SHIPPED | OUTPATIENT
Start: 2021-08-23 | End: 2021-09-02

## 2021-08-23 RX ORDER — SODIUM CHLORIDE 9 MG/ML
INJECTION, SOLUTION INTRAVENOUS CONTINUOUS
Status: DISCONTINUED | OUTPATIENT
Start: 2021-08-23 | End: 2021-08-23 | Stop reason: HOSPADM

## 2021-08-23 RX ADMIN — Medication 10 ML: at 06:08

## 2021-08-23 RX ADMIN — DOXYCYCLINE 100 MG: 100 INJECTION, POWDER, LYOPHILIZED, FOR SOLUTION INTRAVENOUS at 08:08

## 2021-08-23 RX ADMIN — SODIUM CHLORIDE: 0.9 INJECTION, SOLUTION INTRAVENOUS at 08:08

## 2021-08-23 RX ADMIN — ACETAMINOPHEN 650 MG: 325 TABLET ORAL at 07:08

## 2021-08-24 DIAGNOSIS — R91.8 LUNG MASS: Primary | ICD-10-CM

## 2021-08-25 LAB — BACTERIA UR CULT: ABNORMAL

## 2021-08-26 ENCOUNTER — OFFICE VISIT (OUTPATIENT)
Dept: PULMONOLOGY | Facility: CLINIC | Age: 76
End: 2021-08-26
Payer: MEDICARE

## 2021-08-26 VITALS
OXYGEN SATURATION: 85 % | RESPIRATION RATE: 18 BRPM | BODY MASS INDEX: 13.63 KG/M2 | HEART RATE: 96 BPM | DIASTOLIC BLOOD PRESSURE: 84 MMHG | SYSTOLIC BLOOD PRESSURE: 132 MMHG | HEIGHT: 72 IN | WEIGHT: 100.63 LBS

## 2021-08-26 DIAGNOSIS — I70.0 AORTIC ATHEROSCLEROSIS: ICD-10-CM

## 2021-08-26 DIAGNOSIS — J18.9 POSTOBSTRUCTIVE PNEUMONIA: ICD-10-CM

## 2021-08-26 DIAGNOSIS — R64 CACHEXIA: ICD-10-CM

## 2021-08-26 DIAGNOSIS — R91.8 MASS OF RIGHT LUNG: Primary | ICD-10-CM

## 2021-08-26 DIAGNOSIS — J96.11 CHRONIC HYPOXEMIC RESPIRATORY FAILURE: ICD-10-CM

## 2021-08-26 DIAGNOSIS — J43.8 OTHER EMPHYSEMA: ICD-10-CM

## 2021-08-26 DIAGNOSIS — F17.210 TOBACCO DEPENDENCE DUE TO CIGARETTES: ICD-10-CM

## 2021-08-26 PROCEDURE — 99499 UNLISTED E&M SERVICE: CPT | Mod: S$GLB,,, | Performed by: INTERNAL MEDICINE

## 2021-08-26 PROCEDURE — 3008F PR BODY MASS INDEX (BMI) DOCUMENTED: ICD-10-PCS | Mod: CPTII,S$GLB,, | Performed by: INTERNAL MEDICINE

## 2021-08-26 PROCEDURE — 3288F FALL RISK ASSESSMENT DOCD: CPT | Mod: CPTII,S$GLB,, | Performed by: INTERNAL MEDICINE

## 2021-08-26 PROCEDURE — 99499 RISK ADDL DX/OHS AUDIT: ICD-10-PCS | Mod: S$GLB,,, | Performed by: INTERNAL MEDICINE

## 2021-08-26 PROCEDURE — 3079F DIAST BP 80-89 MM HG: CPT | Mod: CPTII,S$GLB,, | Performed by: INTERNAL MEDICINE

## 2021-08-26 PROCEDURE — 3008F BODY MASS INDEX DOCD: CPT | Mod: CPTII,S$GLB,, | Performed by: INTERNAL MEDICINE

## 2021-08-26 PROCEDURE — 3079F PR MOST RECENT DIASTOLIC BLOOD PRESSURE 80-89 MM HG: ICD-10-PCS | Mod: CPTII,S$GLB,, | Performed by: INTERNAL MEDICINE

## 2021-08-26 PROCEDURE — 3075F PR MOST RECENT SYSTOLIC BLOOD PRESS GE 130-139MM HG: ICD-10-PCS | Mod: CPTII,S$GLB,, | Performed by: INTERNAL MEDICINE

## 2021-08-26 PROCEDURE — 3075F SYST BP GE 130 - 139MM HG: CPT | Mod: CPTII,S$GLB,, | Performed by: INTERNAL MEDICINE

## 2021-08-26 PROCEDURE — 3288F PR FALLS RISK ASSESSMENT DOCUMENTED: ICD-10-PCS | Mod: CPTII,S$GLB,, | Performed by: INTERNAL MEDICINE

## 2021-08-26 PROCEDURE — 1160F PR REVIEW ALL MEDS BY PRESCRIBER/CLIN PHARMACIST DOCUMENTED: ICD-10-PCS | Mod: CPTII,S$GLB,, | Performed by: INTERNAL MEDICINE

## 2021-08-26 PROCEDURE — 1160F RVW MEDS BY RX/DR IN RCRD: CPT | Mod: CPTII,S$GLB,, | Performed by: INTERNAL MEDICINE

## 2021-08-26 PROCEDURE — 1101F PR PT FALLS ASSESS DOC 0-1 FALLS W/OUT INJ PAST YR: ICD-10-PCS | Mod: CPTII,S$GLB,, | Performed by: INTERNAL MEDICINE

## 2021-08-26 PROCEDURE — 1101F PT FALLS ASSESS-DOCD LE1/YR: CPT | Mod: CPTII,S$GLB,, | Performed by: INTERNAL MEDICINE

## 2021-08-26 PROCEDURE — 99215 OFFICE O/P EST HI 40 MIN: CPT | Mod: S$GLB,,, | Performed by: INTERNAL MEDICINE

## 2021-08-26 PROCEDURE — 1159F PR MEDICATION LIST DOCUMENTED IN MEDICAL RECORD: ICD-10-PCS | Mod: CPTII,S$GLB,, | Performed by: INTERNAL MEDICINE

## 2021-08-26 PROCEDURE — 99215 PR OFFICE/OUTPT VISIT, EST, LEVL V, 40-54 MIN: ICD-10-PCS | Mod: S$GLB,,, | Performed by: INTERNAL MEDICINE

## 2021-08-26 PROCEDURE — 99999 PR PBB SHADOW E&M-EST. PATIENT-LVL III: ICD-10-PCS | Mod: PBBFAC,,, | Performed by: INTERNAL MEDICINE

## 2021-08-26 PROCEDURE — 99999 PR PBB SHADOW E&M-EST. PATIENT-LVL III: CPT | Mod: PBBFAC,,, | Performed by: INTERNAL MEDICINE

## 2021-08-26 PROCEDURE — 1159F MED LIST DOCD IN RCRD: CPT | Mod: CPTII,S$GLB,, | Performed by: INTERNAL MEDICINE

## 2021-08-26 RX ORDER — CYPROHEPTADINE HYDROCHLORIDE 4 MG/1
4 TABLET ORAL DAILY
COMMUNITY
Start: 2021-06-22

## 2021-08-26 RX ORDER — LOPERAMIDE HYDROCHLORIDE 2 MG/1
2 CAPSULE ORAL 3 TIMES DAILY PRN
COMMUNITY
Start: 2021-06-22

## 2021-08-27 ENCOUNTER — TELEPHONE (OUTPATIENT)
Dept: PULMONOLOGY | Facility: CLINIC | Age: 76
End: 2021-08-27

## 2021-09-02 ENCOUNTER — TELEPHONE (OUTPATIENT)
Dept: PULMONOLOGY | Facility: HOSPITAL | Age: 76
End: 2021-09-02

## 2021-12-21 ENCOUNTER — PATIENT OUTREACH (OUTPATIENT)
Dept: ADMINISTRATIVE | Facility: HOSPITAL | Age: 76
End: 2021-12-21
Payer: MEDICARE

## 2021-12-21 ENCOUNTER — TELEPHONE (OUTPATIENT)
Dept: ADMINISTRATIVE | Facility: HOSPITAL | Age: 76
End: 2021-12-21
Payer: MEDICARE

## 2024-09-13 NOTE — HPI
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   
Ty López is a 75 year old male with no known past medical history who presented to the [facility] ED with complaints of dizziness and findings of hypotension. The patient reports being dizzy for months. There is associated weakness that has prevented him from driving. He denies other symptoms including cough, fever, shortness of breath, dysphagia, . The patient reports that for the last three years, he has been drinking at least 6 beers and a pint of gin daily. He used to drink 24 beers, but has cut back in the last several months. In the ED, the patient was found to have a right suprahilar infiltrate on chest x-ray. Labs were significant for an alcohol level of 160, initial lactic acid of 3.5 with a repeat of 1.4, sodium of 131, creatinine of 2.7 with a previous measurement of 0.7 on 4/20/20 and troponin of 0.062. The patient is a full code. His daughter is his surrogate medical decision maker.   
[FreeTextEntry1] : Chest CT demonstrates overall stable findings The right upper lobe nodule appears stable to somewhat smaller Images personally reviewed